# Patient Record
Sex: FEMALE | Race: WHITE | NOT HISPANIC OR LATINO | URBAN - NONMETROPOLITAN AREA
[De-identification: names, ages, dates, MRNs, and addresses within clinical notes are randomized per-mention and may not be internally consistent; named-entity substitution may affect disease eponyms.]

---
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
## 2025-06-08 ENCOUNTER — HOSPITAL ENCOUNTER (OUTPATIENT)
Facility: HOSPITAL | Age: 20
Setting detail: OBSERVATION
Discharge: HOME | End: 2025-06-09
Attending: EMERGENCY MEDICINE | Admitting: INTERNAL MEDICINE
Payer: COMMERCIAL

## 2025-06-08 DIAGNOSIS — E10.10 DIABETIC KETOACIDOSIS WITHOUT COMA ASSOCIATED WITH TYPE 1 DIABETES MELLITUS: Primary | ICD-10-CM

## 2025-06-08 LAB
ALBUMIN SERPL-MCNC: 4.1 G/DL (ref 3.5–5.2)
ALBUMIN/GLOB SERPL: 1.5 G/DL
ALP SERPL-CCNC: 135 U/L (ref 39–117)
ALT SERPL W P-5'-P-CCNC: 10 U/L (ref 1–33)
ANION GAP SERPL CALCULATED.3IONS-SCNC: 22.5 MMOL/L (ref 5–15)
AST SERPL-CCNC: 10 U/L (ref 1–32)
ATMOSPHERIC PRESS: 730 MMHG
BASE EXCESS BLDV CALC-SCNC: -6.8 MMOL/L (ref 0–2)
BASOPHILS # BLD AUTO: 0.03 10*3/MM3 (ref 0–0.2)
BASOPHILS NFR BLD AUTO: 0.5 % (ref 0–1.5)
BDY SITE: ABNORMAL
BILIRUB SERPL-MCNC: 0.6 MG/DL (ref 0–1.2)
BUN SERPL-MCNC: 11 MG/DL (ref 6–20)
BUN/CREAT SERPL: 15.3 (ref 7–25)
CALCIUM SPEC-SCNC: 10 MG/DL (ref 8.6–10.5)
CHLORIDE SERPL-SCNC: 101 MMOL/L (ref 98–107)
CO2 SERPL-SCNC: 14.5 MMOL/L (ref 22–29)
COHGB MFR BLD: 1.1 % (ref 0–5)
CREAT SERPL-MCNC: 0.72 MG/DL (ref 0.57–1)
DEPRECATED RDW RBC AUTO: 44.5 FL (ref 37–54)
EGFRCR SERPLBLD CKD-EPI 2021: 123.7 ML/MIN/1.73
EOSINOPHIL # BLD AUTO: 0.03 10*3/MM3 (ref 0–0.4)
EOSINOPHIL NFR BLD AUTO: 0.5 % (ref 0.3–6.2)
ERYTHROCYTE [DISTWIDTH] IN BLOOD BY AUTOMATED COUNT: 14.4 % (ref 12.3–15.4)
GLOBULIN UR ELPH-MCNC: 2.7 GM/DL
GLUCOSE SERPL-MCNC: 530 MG/DL (ref 65–99)
HCG SERPL QL: NEGATIVE
HCO3 BLDV-SCNC: 18.3 MMOL/L (ref 22–28)
HCT VFR BLD AUTO: 40.1 % (ref 34–46.6)
HGB BLD-MCNC: 14.1 G/DL (ref 12–15.9)
IMM GRANULOCYTES # BLD AUTO: 0.06 10*3/MM3 (ref 0–0.05)
IMM GRANULOCYTES NFR BLD AUTO: 0.9 % (ref 0–0.5)
LYMPHOCYTES # BLD AUTO: 1.64 10*3/MM3 (ref 0.7–3.1)
LYMPHOCYTES NFR BLD AUTO: 25.9 % (ref 19.6–45.3)
Lab: ABNORMAL
MCH RBC QN AUTO: 30.3 PG (ref 26.6–33)
MCHC RBC AUTO-ENTMCNC: 35.2 G/DL (ref 31.5–35.7)
MCV RBC AUTO: 86.2 FL (ref 79–97)
METHGB BLD QL: 0.8 % (ref 0–3)
MODALITY: ABNORMAL
MONOCYTES # BLD AUTO: 0.46 10*3/MM3 (ref 0.1–0.9)
MONOCYTES NFR BLD AUTO: 7.3 % (ref 5–12)
NEUTROPHILS NFR BLD AUTO: 4.12 10*3/MM3 (ref 1.7–7)
NEUTROPHILS NFR BLD AUTO: 64.9 % (ref 42.7–76)
NOTIFIED BY: ABNORMAL
NOTIFIED WHO: ABNORMAL
NRBC BLD AUTO-RTO: 0 /100 WBC (ref 0–0.2)
OXYHGB MFR BLDV: 39.8 % (ref 40–70)
PCO2 BLDV: 34.9 MM HG (ref 40–50)
PH BLDV: 7.33 PH UNITS (ref 7.32–7.42)
PLATELET # BLD AUTO: 301 10*3/MM3 (ref 140–450)
PMV BLD AUTO: 10.5 FL (ref 6–12)
PO2 BLDV: 22.7 MM HG (ref 30–50)
POTASSIUM SERPL-SCNC: 3.6 MMOL/L (ref 3.5–5.2)
PROT SERPL-MCNC: 6.8 G/DL (ref 6–8.5)
RBC # BLD AUTO: 4.65 10*6/MM3 (ref 3.77–5.28)
SAO2 % BLDCOV: 40.6 % (ref 45–75)
SODIUM SERPL-SCNC: 138 MMOL/L (ref 136–145)
VENTILATOR MODE: ABNORMAL
WBC NRBC COR # BLD AUTO: 6.34 10*3/MM3 (ref 3.4–10.8)

## 2025-06-08 PROCEDURE — 84100 ASSAY OF PHOSPHORUS: CPT | Performed by: EMERGENCY MEDICINE

## 2025-06-08 PROCEDURE — 82010 KETONE BODYS QUAN: CPT | Performed by: FAMILY MEDICINE

## 2025-06-08 PROCEDURE — 99285 EMERGENCY DEPT VISIT HI MDM: CPT | Performed by: EMERGENCY MEDICINE

## 2025-06-08 PROCEDURE — 25810000003 SODIUM CHLORIDE 0.9 % SOLUTION: Performed by: EMERGENCY MEDICINE

## 2025-06-08 PROCEDURE — 84703 CHORIONIC GONADOTROPIN ASSAY: CPT | Performed by: EMERGENCY MEDICINE

## 2025-06-08 PROCEDURE — 85025 COMPLETE CBC W/AUTO DIFF WBC: CPT | Performed by: EMERGENCY MEDICINE

## 2025-06-08 PROCEDURE — 82820 HEMOGLOBIN-OXYGEN AFFINITY: CPT

## 2025-06-08 PROCEDURE — 83036 HEMOGLOBIN GLYCOSYLATED A1C: CPT | Performed by: EMERGENCY MEDICINE

## 2025-06-08 PROCEDURE — G0378 HOSPITAL OBSERVATION PER HR: HCPCS

## 2025-06-08 PROCEDURE — 80053 COMPREHEN METABOLIC PANEL: CPT | Performed by: EMERGENCY MEDICINE

## 2025-06-08 PROCEDURE — 82805 BLOOD GASES W/O2 SATURATION: CPT

## 2025-06-08 PROCEDURE — 83735 ASSAY OF MAGNESIUM: CPT | Performed by: EMERGENCY MEDICINE

## 2025-06-08 RX ORDER — SODIUM CHLORIDE 0.9 % (FLUSH) 0.9 %
10 SYRINGE (ML) INJECTION AS NEEDED
Status: DISCONTINUED | OUTPATIENT
Start: 2025-06-08 | End: 2025-06-09 | Stop reason: HOSPADM

## 2025-06-08 RX ADMIN — SODIUM CHLORIDE 1000 ML: 9 INJECTION, SOLUTION INTRAVENOUS at 23:13

## 2025-06-09 ENCOUNTER — APPOINTMENT (OUTPATIENT)
Dept: GENERAL RADIOLOGY | Facility: HOSPITAL | Age: 20
End: 2025-06-09
Payer: COMMERCIAL

## 2025-06-09 VITALS
HEART RATE: 84 BPM | HEIGHT: 64 IN | BODY MASS INDEX: 26.76 KG/M2 | OXYGEN SATURATION: 100 % | WEIGHT: 156.75 LBS | DIASTOLIC BLOOD PRESSURE: 85 MMHG | TEMPERATURE: 98.3 F | SYSTOLIC BLOOD PRESSURE: 118 MMHG | RESPIRATION RATE: 15 BRPM

## 2025-06-09 PROBLEM — E11.10 DKA (DIABETIC KETOACIDOSIS): Status: ACTIVE | Noted: 2025-06-09

## 2025-06-09 LAB
AMPHET+METHAMPHET UR QL: NEGATIVE
AMPHETAMINES UR QL: NEGATIVE
ANION GAP SERPL CALCULATED.3IONS-SCNC: 8.7 MMOL/L (ref 5–15)
ANION GAP SERPL CALCULATED.3IONS-SCNC: 9.6 MMOL/L (ref 5–15)
ANION GAP SERPL CALCULATED.3IONS-SCNC: 9.7 MMOL/L (ref 5–15)
B-OH-BUTYR SERPL-SCNC: 4.94 MMOL/L (ref 0.02–0.27)
BARBITURATES UR QL SCN: NEGATIVE
BENZODIAZ UR QL SCN: NEGATIVE
BILIRUB UR QL STRIP: NEGATIVE
BUN SERPL-MCNC: 6 MG/DL (ref 6–20)
BUN SERPL-MCNC: 7 MG/DL (ref 6–20)
BUN SERPL-MCNC: 9 MG/DL (ref 6–20)
BUN/CREAT SERPL: 17.1 (ref 7–25)
BUN/CREAT SERPL: 21.9 (ref 7–25)
BUN/CREAT SERPL: 24.3 (ref 7–25)
BUPRENORPHINE SERPL-MCNC: NEGATIVE NG/ML
CALCIUM SPEC-SCNC: 8.4 MG/DL (ref 8.6–10.5)
CALCIUM SPEC-SCNC: 8.4 MG/DL (ref 8.6–10.5)
CALCIUM SPEC-SCNC: 8.7 MG/DL (ref 8.6–10.5)
CANNABINOIDS SERPL QL: NEGATIVE
CHLORIDE SERPL-SCNC: 109 MMOL/L (ref 98–107)
CHLORIDE SERPL-SCNC: 111 MMOL/L (ref 98–107)
CHLORIDE SERPL-SCNC: 112 MMOL/L (ref 98–107)
CLARITY UR: CLEAR
CO2 SERPL-SCNC: 17.3 MMOL/L (ref 22–29)
CO2 SERPL-SCNC: 18.3 MMOL/L (ref 22–29)
CO2 SERPL-SCNC: 19.4 MMOL/L (ref 22–29)
COCAINE UR QL: NEGATIVE
COLOR UR: YELLOW
CORTIS SERPL-MCNC: 22.9 MCG/DL
CREAT SERPL-MCNC: 0.32 MG/DL (ref 0.57–1)
CREAT SERPL-MCNC: 0.35 MG/DL (ref 0.57–1)
CREAT SERPL-MCNC: 0.37 MG/DL (ref 0.57–1)
EGFRCR SERPLBLD CKD-EPI 2021: 149.2 ML/MIN/1.73
EGFRCR SERPLBLD CKD-EPI 2021: 151.2 ML/MIN/1.73
EGFRCR SERPLBLD CKD-EPI 2021: 154.5 ML/MIN/1.73
FENTANYL UR-MCNC: NEGATIVE NG/ML
GLUCOSE BLDC GLUCOMTR-MCNC: 114 MG/DL (ref 70–130)
GLUCOSE BLDC GLUCOMTR-MCNC: 122 MG/DL (ref 70–130)
GLUCOSE BLDC GLUCOMTR-MCNC: 130 MG/DL (ref 70–130)
GLUCOSE BLDC GLUCOMTR-MCNC: 131 MG/DL (ref 70–130)
GLUCOSE BLDC GLUCOMTR-MCNC: 133 MG/DL (ref 70–130)
GLUCOSE BLDC GLUCOMTR-MCNC: 146 MG/DL (ref 70–130)
GLUCOSE BLDC GLUCOMTR-MCNC: 150 MG/DL (ref 70–130)
GLUCOSE BLDC GLUCOMTR-MCNC: 177 MG/DL (ref 70–130)
GLUCOSE BLDC GLUCOMTR-MCNC: 200 MG/DL (ref 70–130)
GLUCOSE BLDC GLUCOMTR-MCNC: 213 MG/DL (ref 70–130)
GLUCOSE BLDC GLUCOMTR-MCNC: 219 MG/DL (ref 70–130)
GLUCOSE BLDC GLUCOMTR-MCNC: 238 MG/DL (ref 70–130)
GLUCOSE BLDC GLUCOMTR-MCNC: 291 MG/DL (ref 70–130)
GLUCOSE BLDC GLUCOMTR-MCNC: 375 MG/DL (ref 70–130)
GLUCOSE BLDC GLUCOMTR-MCNC: 470 MG/DL (ref 70–130)
GLUCOSE SERPL-MCNC: 111 MG/DL (ref 65–99)
GLUCOSE SERPL-MCNC: 181 MG/DL (ref 65–99)
GLUCOSE SERPL-MCNC: 243 MG/DL (ref 65–99)
GLUCOSE UR STRIP-MCNC: ABNORMAL MG/DL
HBA1C MFR BLD: 12.1 % (ref 4.8–5.6)
HGB UR QL STRIP.AUTO: NEGATIVE
KETONES UR QL STRIP: ABNORMAL
LEUKOCYTE ESTERASE UR QL STRIP.AUTO: NEGATIVE
MAGNESIUM SERPL-MCNC: 1.7 MG/DL (ref 1.7–2.2)
MAGNESIUM SERPL-MCNC: 1.7 MG/DL (ref 1.7–2.2)
MAGNESIUM SERPL-MCNC: 1.8 MG/DL (ref 1.7–2.2)
MAGNESIUM SERPL-MCNC: 1.8 MG/DL (ref 1.7–2.2)
METHADONE UR QL SCN: NEGATIVE
NITRITE UR QL STRIP: NEGATIVE
OPIATES UR QL: NEGATIVE
OSMOLALITY SERPL: 289 MOSM/KG (ref 275–300)
OXYCODONE UR QL SCN: NEGATIVE
PCP UR QL SCN: NEGATIVE
PH UR STRIP.AUTO: 5.5 [PH] (ref 5–8)
PHOSPHATE SERPL-MCNC: 2.9 MG/DL (ref 2.5–4.5)
PHOSPHATE SERPL-MCNC: 3.4 MG/DL (ref 2.5–4.5)
POTASSIUM SERPL-SCNC: 3.3 MMOL/L (ref 3.5–5.2)
POTASSIUM SERPL-SCNC: 3.4 MMOL/L (ref 3.5–5.2)
POTASSIUM SERPL-SCNC: 3.4 MMOL/L (ref 3.5–5.2)
PROT UR QL STRIP: NEGATIVE
SODIUM SERPL-SCNC: 138 MMOL/L (ref 136–145)
SODIUM SERPL-SCNC: 138 MMOL/L (ref 136–145)
SODIUM SERPL-SCNC: 139 MMOL/L (ref 136–145)
SP GR UR STRIP: >=1.03 (ref 1–1.03)
TRICYCLICS UR QL SCN: NEGATIVE
TSH SERPL DL<=0.05 MIU/L-ACNC: 1.47 UIU/ML (ref 0.27–4.2)
UROBILINOGEN UR QL STRIP: ABNORMAL

## 2025-06-09 PROCEDURE — 82948 REAGENT STRIP/BLOOD GLUCOSE: CPT

## 2025-06-09 PROCEDURE — 82533 TOTAL CORTISOL: CPT | Performed by: INTERNAL MEDICINE

## 2025-06-09 PROCEDURE — 80048 BASIC METABOLIC PNL TOTAL CA: CPT | Performed by: FAMILY MEDICINE

## 2025-06-09 PROCEDURE — 84443 ASSAY THYROID STIM HORMONE: CPT | Performed by: INTERNAL MEDICINE

## 2025-06-09 PROCEDURE — 25010000002 POTASSIUM CHLORIDE 10 MEQ/100ML SOLUTION: Performed by: INTERNAL MEDICINE

## 2025-06-09 PROCEDURE — 80307 DRUG TEST PRSMV CHEM ANLYZR: CPT | Performed by: FAMILY MEDICINE

## 2025-06-09 PROCEDURE — 83930 ASSAY OF BLOOD OSMOLALITY: CPT | Performed by: FAMILY MEDICINE

## 2025-06-09 PROCEDURE — 81003 URINALYSIS AUTO W/O SCOPE: CPT | Performed by: FAMILY MEDICINE

## 2025-06-09 PROCEDURE — G0378 HOSPITAL OBSERVATION PER HR: HCPCS

## 2025-06-09 PROCEDURE — 96368 THER/DIAG CONCURRENT INF: CPT

## 2025-06-09 PROCEDURE — 71045 X-RAY EXAM CHEST 1 VIEW: CPT

## 2025-06-09 PROCEDURE — 96366 THER/PROPH/DIAG IV INF ADDON: CPT

## 2025-06-09 PROCEDURE — 63710000001 INSULIN GLARGINE PER 5 UNITS: Performed by: INTERNAL MEDICINE

## 2025-06-09 PROCEDURE — 25010000002 POTASSIUM CHLORIDE 10 MEQ/100ML SOLUTION: Performed by: FAMILY MEDICINE

## 2025-06-09 PROCEDURE — 83735 ASSAY OF MAGNESIUM: CPT | Performed by: FAMILY MEDICINE

## 2025-06-09 PROCEDURE — 84100 ASSAY OF PHOSPHORUS: CPT | Performed by: FAMILY MEDICINE

## 2025-06-09 PROCEDURE — 96365 THER/PROPH/DIAG IV INF INIT: CPT

## 2025-06-09 PROCEDURE — 25010000002 POTASSIUM CHLORIDE PER 2 MEQ: Performed by: FAMILY MEDICINE

## 2025-06-09 RX ORDER — DEXTROSE MONOHYDRATE, SODIUM CHLORIDE, AND POTASSIUM CHLORIDE 50; 2.98; 4.5 G/1000ML; G/1000ML; G/1000ML
125 INJECTION, SOLUTION INTRAVENOUS CONTINUOUS PRN
Status: DISCONTINUED | OUTPATIENT
Start: 2025-06-09 | End: 2025-06-09

## 2025-06-09 RX ORDER — INSULIN LISPRO 100 [IU]/ML
1-200 INJECTION, SOLUTION INTRAVENOUS; SUBCUTANEOUS
Status: DISCONTINUED | OUTPATIENT
Start: 2025-06-09 | End: 2025-06-09

## 2025-06-09 RX ORDER — SODIUM CHLORIDE 0.9 % (FLUSH) 0.9 %
10 SYRINGE (ML) INJECTION EVERY 12 HOURS SCHEDULED
Status: DISCONTINUED | OUTPATIENT
Start: 2025-06-09 | End: 2025-06-09

## 2025-06-09 RX ORDER — DEXTROSE MONOHYDRATE, SODIUM CHLORIDE, AND POTASSIUM CHLORIDE 50; 1.49; 9 G/1000ML; G/1000ML; G/1000ML
125 INJECTION, SOLUTION INTRAVENOUS CONTINUOUS PRN
Status: DISCONTINUED | OUTPATIENT
Start: 2025-06-09 | End: 2025-06-09

## 2025-06-09 RX ORDER — DEXTROSE MONOHYDRATE AND SODIUM CHLORIDE 5; .9 G/100ML; G/100ML
125 INJECTION, SOLUTION INTRAVENOUS CONTINUOUS PRN
Status: DISCONTINUED | OUTPATIENT
Start: 2025-06-09 | End: 2025-06-09

## 2025-06-09 RX ORDER — SODIUM CHLORIDE 9 MG/ML
40 INJECTION, SOLUTION INTRAVENOUS AS NEEDED
Status: DISCONTINUED | OUTPATIENT
Start: 2025-06-09 | End: 2025-06-09 | Stop reason: HOSPADM

## 2025-06-09 RX ORDER — BISACODYL 5 MG/1
5 TABLET, DELAYED RELEASE ORAL DAILY PRN
Status: DISCONTINUED | OUTPATIENT
Start: 2025-06-09 | End: 2025-06-09

## 2025-06-09 RX ORDER — ONDANSETRON 4 MG/1
4 TABLET, ORALLY DISINTEGRATING ORAL EVERY 6 HOURS PRN
Status: DISCONTINUED | OUTPATIENT
Start: 2025-06-09 | End: 2025-06-09 | Stop reason: HOSPADM

## 2025-06-09 RX ORDER — NICOTINE POLACRILEX 4 MG
15 LOZENGE BUCCAL
Status: DISCONTINUED | OUTPATIENT
Start: 2025-06-09 | End: 2025-06-09 | Stop reason: HOSPADM

## 2025-06-09 RX ORDER — INSULIN LISPRO 100 [IU]/ML
1-200 INJECTION, SOLUTION INTRAVENOUS; SUBCUTANEOUS AS NEEDED
Status: DISCONTINUED | OUTPATIENT
Start: 2025-06-09 | End: 2025-06-09 | Stop reason: HOSPADM

## 2025-06-09 RX ORDER — POTASSIUM CHLORIDE 7.45 MG/ML
10 INJECTION INTRAVENOUS
Status: DISCONTINUED | OUTPATIENT
Start: 2025-06-09 | End: 2025-06-09

## 2025-06-09 RX ORDER — SODIUM CHLORIDE 450 MG/100ML
200 INJECTION, SOLUTION INTRAVENOUS CONTINUOUS PRN
Status: DISCONTINUED | OUTPATIENT
Start: 2025-06-09 | End: 2025-06-09

## 2025-06-09 RX ORDER — DEXTROSE MONOHYDRATE AND SODIUM CHLORIDE 5; .45 G/100ML; G/100ML
125 INJECTION, SOLUTION INTRAVENOUS CONTINUOUS PRN
Status: DISCONTINUED | OUTPATIENT
Start: 2025-06-09 | End: 2025-06-09

## 2025-06-09 RX ORDER — AMOXICILLIN 250 MG
2 CAPSULE ORAL 2 TIMES DAILY
Status: DISCONTINUED | OUTPATIENT
Start: 2025-06-09 | End: 2025-06-09

## 2025-06-09 RX ORDER — POLYETHYLENE GLYCOL 3350 17 G/17G
17 POWDER, FOR SOLUTION ORAL DAILY PRN
Status: DISCONTINUED | OUTPATIENT
Start: 2025-06-09 | End: 2025-06-09

## 2025-06-09 RX ORDER — NITROGLYCERIN 0.4 MG/1
0.4 TABLET SUBLINGUAL
Status: DISCONTINUED | OUTPATIENT
Start: 2025-06-09 | End: 2025-06-09 | Stop reason: HOSPADM

## 2025-06-09 RX ORDER — NICOTINE POLACRILEX 4 MG
15 LOZENGE BUCCAL
Status: DISCONTINUED | OUTPATIENT
Start: 2025-06-09 | End: 2025-06-09 | Stop reason: SDUPTHER

## 2025-06-09 RX ORDER — SODIUM CHLORIDE 0.9 % (FLUSH) 0.9 %
10 SYRINGE (ML) INJECTION AS NEEDED
Status: DISCONTINUED | OUTPATIENT
Start: 2025-06-09 | End: 2025-06-09 | Stop reason: HOSPADM

## 2025-06-09 RX ORDER — DEXTROSE MONOHYDRATE 25 G/50ML
10-50 INJECTION, SOLUTION INTRAVENOUS
Status: DISCONTINUED | OUTPATIENT
Start: 2025-06-09 | End: 2025-06-09

## 2025-06-09 RX ORDER — BISACODYL 10 MG
10 SUPPOSITORY, RECTAL RECTAL DAILY PRN
Status: DISCONTINUED | OUTPATIENT
Start: 2025-06-09 | End: 2025-06-09

## 2025-06-09 RX ORDER — DEXTROSE MONOHYDRATE, SODIUM CHLORIDE, AND POTASSIUM CHLORIDE 50; 2.98; 9 G/1000ML; G/1000ML; G/1000ML
125 INJECTION, SOLUTION INTRAVENOUS CONTINUOUS PRN
Status: DISCONTINUED | OUTPATIENT
Start: 2025-06-09 | End: 2025-06-09

## 2025-06-09 RX ORDER — DEXTROSE MONOHYDRATE 25 G/50ML
10-50 INJECTION, SOLUTION INTRAVENOUS
Status: DISCONTINUED | OUTPATIENT
Start: 2025-06-09 | End: 2025-06-09 | Stop reason: HOSPADM

## 2025-06-09 RX ORDER — SODIUM CHLORIDE AND POTASSIUM CHLORIDE 150; 450 MG/100ML; MG/100ML
200 INJECTION, SOLUTION INTRAVENOUS CONTINUOUS PRN
Status: DISCONTINUED | OUTPATIENT
Start: 2025-06-09 | End: 2025-06-09

## 2025-06-09 RX ORDER — SODIUM CHLORIDE 9 MG/ML
200 INJECTION, SOLUTION INTRAVENOUS CONTINUOUS PRN
Status: DISCONTINUED | OUTPATIENT
Start: 2025-06-09 | End: 2025-06-09

## 2025-06-09 RX ORDER — ACETAMINOPHEN 325 MG/1
650 TABLET ORAL EVERY 4 HOURS PRN
Status: DISCONTINUED | OUTPATIENT
Start: 2025-06-09 | End: 2025-06-09 | Stop reason: HOSPADM

## 2025-06-09 RX ORDER — POTASSIUM CHLORIDE 7.45 MG/ML
10 INJECTION INTRAVENOUS
Status: COMPLETED | OUTPATIENT
Start: 2025-06-09 | End: 2025-06-09

## 2025-06-09 RX ORDER — INSULIN LISPRO 100 [IU]/ML
1-200 INJECTION, SOLUTION INTRAVENOUS; SUBCUTANEOUS
Status: DISCONTINUED | OUTPATIENT
Start: 2025-06-09 | End: 2025-06-09 | Stop reason: HOSPADM

## 2025-06-09 RX ORDER — IBUPROFEN 600 MG/1
1 TABLET ORAL
Status: DISCONTINUED | OUTPATIENT
Start: 2025-06-09 | End: 2025-06-09 | Stop reason: HOSPADM

## 2025-06-09 RX ORDER — INSULIN GLARGINE 100 [IU]/ML
50 INJECTION, SOLUTION SUBCUTANEOUS NIGHTLY
COMMUNITY

## 2025-06-09 RX ORDER — SODIUM CHLORIDE AND POTASSIUM CHLORIDE 300; 900 MG/100ML; MG/100ML
200 INJECTION, SOLUTION INTRAVENOUS CONTINUOUS PRN
Status: DISCONTINUED | OUTPATIENT
Start: 2025-06-09 | End: 2025-06-09

## 2025-06-09 RX ORDER — ONDANSETRON 2 MG/ML
4 INJECTION INTRAMUSCULAR; INTRAVENOUS EVERY 6 HOURS PRN
Status: DISCONTINUED | OUTPATIENT
Start: 2025-06-09 | End: 2025-06-09 | Stop reason: HOSPADM

## 2025-06-09 RX ORDER — SODIUM CHLORIDE AND POTASSIUM CHLORIDE 150; 900 MG/100ML; MG/100ML
200 INJECTION, SOLUTION INTRAVENOUS CONTINUOUS PRN
Status: DISCONTINUED | OUTPATIENT
Start: 2025-06-09 | End: 2025-06-09

## 2025-06-09 RX ORDER — DEXTROSE MONOHYDRATE, SODIUM CHLORIDE, AND POTASSIUM CHLORIDE 50; 1.49; 4.5 G/1000ML; G/1000ML; G/1000ML
125 INJECTION, SOLUTION INTRAVENOUS CONTINUOUS PRN
Status: DISCONTINUED | OUTPATIENT
Start: 2025-06-09 | End: 2025-06-09

## 2025-06-09 RX ORDER — ONDANSETRON 4 MG/1
4 TABLET, ORALLY DISINTEGRATING ORAL EVERY 8 HOURS PRN
Qty: 20 TABLET | Refills: 0 | Status: SHIPPED | OUTPATIENT
Start: 2025-06-09

## 2025-06-09 RX ORDER — ACETAMINOPHEN 650 MG/1
325 SUPPOSITORY RECTAL EVERY 4 HOURS PRN
Status: DISCONTINUED | OUTPATIENT
Start: 2025-06-09 | End: 2025-06-09 | Stop reason: HOSPADM

## 2025-06-09 RX ADMIN — POTASSIUM CHLORIDE 10 MEQ: 7.46 INJECTION, SOLUTION INTRAVENOUS at 03:04

## 2025-06-09 RX ADMIN — Medication 10 ML: at 00:25

## 2025-06-09 RX ADMIN — INSULIN HUMAN 4.1 UNITS/HR: 1 INJECTION, SOLUTION INTRAVENOUS at 00:55

## 2025-06-09 RX ADMIN — POTASSIUM CHLORIDE 10 MEQ: 7.46 INJECTION, SOLUTION INTRAVENOUS at 01:49

## 2025-06-09 RX ADMIN — POTASSIUM CHLORIDE 10 MEQ: 7.46 INJECTION, SOLUTION INTRAVENOUS at 13:33

## 2025-06-09 RX ADMIN — MUPIROCIN 1 APPLICATION: 20 OINTMENT TOPICAL at 02:56

## 2025-06-09 RX ADMIN — INSULIN GLARGINE 6 UNITS: 100 INJECTION, SOLUTION SUBCUTANEOUS at 13:59

## 2025-06-09 RX ADMIN — POTASSIUM CHLORIDE AND SODIUM CHLORIDE 200 ML/HR: 450; 150 INJECTION, SOLUTION INTRAVENOUS at 01:46

## 2025-06-09 RX ADMIN — POTASSIUM CHLORIDE 10 MEQ: 7.46 INJECTION, SOLUTION INTRAVENOUS at 04:10

## 2025-06-09 RX ADMIN — Medication 10 ML: at 00:51

## 2025-06-09 RX ADMIN — POTASSIUM CHLORIDE, DEXTROSE MONOHYDRATE AND SODIUM CHLORIDE 125 ML/HR: 150; 5; 450 INJECTION, SOLUTION INTRAVENOUS at 04:09

## 2025-06-09 RX ADMIN — Medication 10 ML: at 09:32

## 2025-06-09 RX ADMIN — POTASSIUM CHLORIDE 10 MEQ: 7.46 INJECTION, SOLUTION INTRAVENOUS at 05:14

## 2025-06-09 RX ADMIN — POTASSIUM CHLORIDE 10 MEQ: 7.46 INJECTION, SOLUTION INTRAVENOUS at 14:57

## 2025-06-09 RX ADMIN — SODIUM CHLORIDE 1000 ML/HR: 9 INJECTION, SOLUTION INTRAVENOUS at 00:26

## 2025-06-09 RX ADMIN — INSULIN GLARGINE 14 UNITS: 100 INJECTION, SOLUTION SUBCUTANEOUS at 16:14

## 2025-06-09 RX ADMIN — ACETAMINOPHEN 650 MG: 325 TABLET ORAL at 02:56

## 2025-06-09 RX ADMIN — POTASSIUM CHLORIDE 10 MEQ: 7.46 INJECTION, SOLUTION INTRAVENOUS at 12:02

## 2025-06-09 RX ADMIN — POTASSIUM CHLORIDE 10 MEQ: 7.46 INJECTION, SOLUTION INTRAVENOUS at 10:56

## 2025-06-09 NOTE — PLAN OF CARE
Goal Outcome Evaluation:              Outcome Evaluation: Pt without c/.o N/V.  Sinus rhythm on monitor/Remains on room air.  New admit this shift--glu checks improving.  Pt tolerating sips of water with med's.

## 2025-06-09 NOTE — PROGRESS NOTES
"Patient Name: Chanell Dean  YOB: 2005  MRN: [id number]  Admission date: 6/8/2025  Reason for Encounter: MST 2-3 or Nursing Admission Screen    [facility] Clinical Nutrition Assessment     Subjective    Subjective Information     6/9: Patient w/ MST score of 3 - patient w/ 11lbs (6%) wt loss w/in 7 months. Pt admitted w/ DKA and A1C of 12%. Patient currently NPO and currently on glucommander protocol. Will follow-up and monitor diet advancement.      Assessment    H&P and Current Problems      H&P  Past Medical History:   Diagnosis Date    Seizure     \"My blood sugar was high\"    Type 1 diabetes mellitus 2018      History reviewed. No pertinent surgical history.   Current Problems   Admission Diagnosis:  DKA (diabetic ketoacidosis) [E11.10]    Problem List:    DKA (diabetic ketoacidosis)      Other Applicable Nutrition Information:   Insulin dependent T1DM - A1c of 12%     Anthropometrics      BMI, Height, Weight Estimated body mass index is 27.33 kg/m² as calculated from the following:    Height as of this encounter: 161.3 cm (63.5\").    Weight as of this encounter: 71.1 kg (156 lb 12 oz).    Weight Method: Bed scale       Trending Weight Changes weight loss of 11 lbs (6%) over 7 month(s)    Significant?  No       Weight History  Wt Readings from Last 20 Encounters:   06/09/25 0146 71.1 kg (156 lb 12 oz) (85%, Z= 1.04)*   06/08/25 2233 72.1 kg (159 lb) (86%, Z= 1.10)*   10/23/24 1058 73.9 kg (163 lb) (89%, Z= 1.25)*   10/08/24 1047 75.8 kg (167 lb) (91%, Z= 1.34)*     * Growth percentiles are based on CDC (Girls, 2-20 Years) data.        Labs      Comment: Elevated glucose & A1c     Results from last 7 days   Lab Units 06/09/25  0408 06/08/25  2312   SODIUM mmol/L 138 138   POTASSIUM mmol/L 3.4* 3.6   GLUCOSE mg/dL 243* 530*   BUN mg/dL 9.0 11.0   CREATININE mg/dL 0.37* 0.72   CALCIUM mg/dL 8.4* 10.0   PHOSPHORUS mg/dL 2.9 3.4   MAGNESIUM mg/dL 1.7 1.8   ALBUMIN g/dL  --  4.1   BILIRUBIN mg/dL  --  " 0.6   ALK PHOS U/L  --  135*   AST (SGOT) U/L  --  10   ALT (SGPT) U/L  --  10     Results from last 7 days   Lab Units 06/08/25  2312   PLATELETS 10*3/mm3 301   HEMOGLOBIN g/dL 14.1   HEMATOCRIT % 40.1     Lab Results   Component Value Date    HGBA1C 12.10 (H) 06/08/2025          Medications       Scheduled Medications mupirocin, 1 Application, Each Nare, BID  senna-docusate sodium, 2 tablet, Oral, BID  sodium chloride, 10 mL, Intravenous, Q12H  sodium chloride, 10 mL, Intravenous, Q12H        Infusions dextrose 5 % and sodium chloride 0.45 %, 125 mL/hr  dextrose 5 % and sodium chloride 0.45 % with KCl 20 mEq/L, 125 mL/hr, Last Rate: 125 mL/hr (06/09/25 0817)  dextrose 5 % and sodium chloride 0.45 % with KCl 40 mEq/L, 125 mL/hr  dextrose 5 % and sodium chloride 0.9 %, 125 mL/hr  dextrose 5 % and sodium chloride 0.9 % with KCl 20 mEq, 125 mL/hr  dextrose 5% and sodium chloride 0.9% with KCl 40 mEq/L, 125 mL/hr  insulin, 0-100 Units/hr, Last Rate: 2.9 Units/hr (06/09/25 0813)  sodium chloride 0.45 % 1,000 mL with potassium chloride 40 mEq infusion, 200 mL/hr  sodium chloride, 200 mL/hr  sodium chloride 0.45 % with KCl 20 mEq, 200 mL/hr, Last Rate: Stopped (06/09/25 0409)  sodium chloride, 200 mL/hr  sodium chloride 0.9 % with KCl 20 mEq, 200 mL/hr  sodium chloride 0.9 % with KCl 40 mEq/L, 200 mL/hr        PRN Medications   acetaminophen **OR** acetaminophen    senna-docusate sodium **AND** polyethylene glycol **AND** bisacodyl **AND** bisacodyl    Calcium Replacement - Follow Nurse / BPA Driven Protocol    dextrose    dextrose    dextrose 5 % and sodium chloride 0.45 %    dextrose 5 % and sodium chloride 0.45 % with KCl 20 mEq/L    dextrose 5 % and sodium chloride 0.45 % with KCl 40 mEq/L    dextrose 5 % and sodium chloride 0.9 %    dextrose 5 % and sodium chloride 0.9 % with KCl 20 mEq    dextrose 5% and sodium chloride 0.9% with KCl 40 mEq/L    glucagon (human recombinant)    Magnesium Standard Dose Replacement -  Follow Nurse / BPA Driven Protocol    nitroglycerin    ondansetron ODT **OR** ondansetron    Phosphorus Replacement - Follow Nurse / BPA Driven Protocol    Potassium Replacement - Follow Nurse / BPA Driven Protocol    sodium chloride 0.45 % 1,000 mL with potassium chloride 40 mEq infusion    sodium chloride    sodium chloride 0.45 % with KCl 20 mEq    [COMPLETED] Insert Peripheral IV **AND** sodium chloride    sodium chloride    sodium chloride    sodium chloride    sodium chloride    sodium chloride    sodium chloride 0.9 % with KCl 20 mEq    sodium chloride 0.9 % with KCl 40 mEq/L     Physical Findings      Chewing/Swallowing  Teeth Status: Mouth/Teeth WDL: .WDL except, teeth Teeth Symptoms: tooth/teeth missing  Chewing/Swallowing Issues: No issues identified at this time   Edema            Leg, Left Edema: 1+ (Trace) Leg, Right Edema: 1+ (Trace)             Bowel Function  Stool Output  Perineal Care: absorbent brief/pad changed (06/09/25 0145)  Last Bowel Movement: 06/08/25   I/Os  Intake & Output (last 3 days)         06/06 0701  06/07 0700 06/07 0701  06/08 0700 06/08 0701  06/09 0700 06/09 0701  06/10 0700    P.O.   75     I.V. (mL/kg)   641.5 (9)     IV Piggyback   1365     Total Intake(mL/kg)   2081.5 (29.3)     Net   +2081.5                    Lines, Drains, Airways, & Wounds       Active LDAs       Name Placement date Placement time Site Days Last dressing change    Peripheral IV 06/08/25 2313 18 G Anterior;Distal;Left;Upper Arm 06/08/25  2313  Arm  less than 1     Peripheral IV 06/09/25 0109 20 G Posterior;Right Hand 06/09/25  0109  Hand  less than 1                       Nutrition Focused Physical Exam     Trending NFPE      Malnutrition Severity Assessment              Estimated Needs      Energy Requirements    Weight for Calculation 71kg   Method for Estimation  25-30 kcals/kg   Daily Needs (kcal/day) 8158-1328   Protein Requirements    Weight for Calculation 71kg   Method for Estimation 1.0-1.2  gm/kg   Daily Needs (g/day) 71-85   Fluid Requirements     Method for Estimation 1 mL/kcal    Daily Needs (mL/day) 2130     Current Nutrition Orders & Evaluation of Intake      Oral Nutrition     Food Allergies  and Intolerances NKFA   Current PO Diet NPO Diet NPO Type: Strict NPO, Ice Chips, Sips with Meds   Oral Nutrition Supplement None     Trending % PO Intake 6/9: NPO     Enteral Nutrition     Current EN Order Patient isn't on Tube Feeding  Patient doesn't have any tube feeding modular orders     EN Route      EN Tolerance     EN Observation/Intake         Parenteral Nutrition     Current TPN Order    TPN Route    Lipids (mL/%/frequency)     Total # Days on TPN    TPN Observation/Intake       Assessment & Plan   Nutrition Diagnosis and Goals       Nutrition Diagnosis 1 Altered Nutrition Related Lab Values  r/t uncontrolled T1DM as evidenced by A1C of 12%      Nutrition Diagnosis 2         Goal(s) PO Diet Advances When Medically Appropriate      Nutrition Intervention and Prescription       Intervention  Monitor for diet advancement      Diet Prescription NPO    Supplement Prescription      Enteral Prescription        TPN Prescription        Education Provided       Monitoring/Evaluation       Monitor/Evaluation Per Protocol, I&O, PO Intake, Pertinent Labs, Weight, Skin Status, GI Status, Symptoms, POC/GOC, and Hemodynamic Stability      RD Follow-Up Encounter 1-2 days     Electronically signed by:  Laura Ye RD  06/09/25 08:24 EDT

## 2025-06-09 NOTE — NURSING NOTE
Pt asked about her endocrinologist and PCP for follow up appointments. Pt states that she already has a follow up appointment placed with [facility] in [place] and already has an appointment in 2 weeks with her endocrinologist.

## 2025-06-09 NOTE — DISCHARGE SUMMARY
[facility] HOSPITALIST   DISCHARGE SUMMARY      Name:  Chanell Dean   Age:  19 y.o.  Sex:  female  :  2005  MRN:  [id number]   Visit Number:  [id number]    Admission Date:  2025  Date of Discharge:  2025  Primary Care Physician:  Provider, No Known    Important issues to note:    Start: Zofran  Stop: Nothing  Follow up: PCP and endocrinology  Brief Summary: Presented with DKA due to uncontrolled type 1 diabetes. Initially had required DKA protocol in ICU which improved to normal range blood sugars with tolerating p.o. by the time of discharge.  Patient has all the insulin and testing supplies that she needs.    Discharge Diagnoses:     Mild DKA in setting of type 1 diabetes, poorly controlled, POA  Dehydration    Problem List:     Active Hospital Problems    Diagnosis  POA    **DKA (diabetic ketoacidosis) [E11.10]  Yes      Resolved Hospital Problems   No resolved problems to display.     Presenting Problem:    Chief Complaint   Patient presents with    Hyperglycemia      Consults:     Consulting Physician(s)                     None                    History Of Presenting Illness:       19-year-old female with a history of insulin-dependent diabetes, anxiety, who presented to the emergency room with complaints of weakness, dizziness, dehydration and high glucose.  States has a history of uncontrolled glucose, DKA in the past.  Feels dehydrated.  Sugars been running in the 300 range at home or more.  Recently moved back to [place], does not currently have a doctor here.  Previously saw Kim Lin endocrinology at .  She denies any abdominal pain or vomiting.  No changes in bowel.  No chest pain palpitations or shortness of breath.     Of note, RN noted patient had indicated some issues with her current living conditions, police have been contacted prior to my evaluation.  Not sure of all the details.     In the ER she was hemodynamically stable.  She had evidence of mild  metabolic acidosis, hyperglycemia.  Normal kidney function.  Chest x-ray unremarkable.  She had been ordered IV fluid resuscitation was started on Glucomander for DKA.    Hospital Course:    DKA:  6/9/25:Admitting provider documentation reviewed. AG resolved.  Tolerated p.o.  Normal TSH cortisol pending    DKA resolved.  Follow-up with her endocrinologist.  Patient has insulin CGM ordered.  Zofran ordered.    Dehydration resolved.    Code Status: Full  Diet: Diabetic  Disposition: Home  Edited by: Jay Sanders DO at 6/9/2025 1546      Vital Signs:    Temp:  [97.9 °F (36.6 °C)-98.6 °F (37 °C)] 98.3 °F (36.8 °C)  Heart Rate:  [] 70  Resp:  [8-22] 15  BP: ()/(57-95) 98/75    Physical Exam:    Constitutional: No acute distress, awake, alert  HENT: NCAT, mucous membranes moist  Respiratory: Clear to auscultation bilaterally, respiratory effort normal   Cardiovascular: RRR, no murmurs, rubs, or gallops  Gastrointestinal: Positive bowel sounds, soft, nontender, nondistended  Musculoskeletal: No bilateral ankle edema  Psychiatric: Appropriate affect, cooperative  Neurologic: Oriented x 3, speech clear  Skin: No rashes  Edited by: Jay Sanders DO at 6/9/2025 0707    Pertinent Lab Results:     Results from last 7 days   Lab Units 06/09/25  1218 06/09/25  0807 06/09/25  0408 06/08/25  2312   SODIUM mmol/L 138 139 138 138   POTASSIUM mmol/L 3.4* 3.3* 3.4* 3.6   CHLORIDE mmol/L 109* 112* 111* 101   CO2 mmol/L 19.4* 18.3* 17.3* 14.5*   BUN mg/dL 6.0 7.0 9.0 11.0   CREATININE mg/dL 0.35* 0.32* 0.37* 0.72   CALCIUM mg/dL 8.7 8.4* 8.4* 10.0   BILIRUBIN mg/dL  --   --   --  0.6   ALK PHOS U/L  --   --   --  135*   ALT (SGPT) U/L  --   --   --  10   AST (SGOT) U/L  --   --   --  10   GLUCOSE mg/dL 111* 181* 243* 530*     Results from last 7 days   Lab Units 06/08/25  2312   WBC 10*3/mm3 6.34   HEMOGLOBIN g/dL 14.1   HEMATOCRIT % 40.1   PLATELETS 10*3/mm3 301                                   Pertinent  Radiology Results:    Imaging Results (All)       Procedure Component Value Units Date/Time    XR Chest 1 View [[id number]] Collected: 06/09/25 0804     Updated: 06/09/25 0808    Narrative:      PROCEDURE: XR CHEST 1 VW-     HISTORY: Assess for Fluid Overload, near syncope, high blood sugar     COMPARISON: None.     FINDINGS: The heart is normal in size. Question of faint right  infrahilar opacity; this may represent overlying soft tissue. Left lung  is clear.. The mediastinum is unremarkable. There is no pneumothorax.   There are no acute osseous abnormalities. Apical lordotic positioning  noted.        Impression:      Question faint right infrahilar opacity versus overlying  soft tissue; small focus of pneumonitis not excluded. Follow-up may be  helpful..              This report was signed and finalized on 6/9/2025 8:06 AM by Rupal Jones MD.               Echo:      Condition on Discharge:      Stable.    Code status during the hospital stay:    Code Status and Medical Interventions: CPR (Attempt to Resuscitate); Full Support   Ordered at: 06/09/25 0059     Code Status (Patient has no pulse and is not breathing):    CPR (Attempt to Resuscitate)     Medical Interventions (Patient has pulse or is breathing):    Full Support     Discharge Disposition:        Discharge Medications:       Discharge Medications        New Medications        Instructions Start Date   ondansetron ODT 4 MG disintegrating tablet  Commonly known as: ZOFRAN-ODT   4 mg, Translingual, Every 8 Hours PRN             Continue These Medications        Instructions Start Date   Dexcom G6 Transmitter misc   USE AS DIRECTED AND CHANGE EVERY 3 MONTHS      insulin glargine 100 UNIT/ML injection  Commonly known as: LANTUS, SEMGLEE   50 Units, Subcutaneous, Nightly, Pt has been taking 50 units.      Insulin Lispro (1 Unit Dial) 100 UNIT/ML solution pen-injector  Commonly known as: HUMALOG   INJECT 1 UNIT FOR EVERY 5G CARB AND AS NEEDED FOR  HYPERGLYCEMIA.  UNITS      Potassium 99 MG tablet   99 mg, Every Other Day             Stop These Medications      Tresiba FlexTouch 200 UNIT/ML solution pen-injector pen injection  Generic drug: Insulin Degludec            Discharge Diet:     Diet Instructions       Advance Diet As Tolerated -Target Diet: Diabetic diet      Target Diet: Diabetic diet          Activity at Discharge:       Follow-up Appointments:    Additional Instructions for the Follow-ups that You Need to Schedule       Discharge Follow-up with PCP   As directed       Currently Documented PCP:    Provider, No Known    PCP Phone Number:    None     Follow Up Details: 1 week        Discharge Follow-up with Specified Provider: Endocrinology; 2 Weeks   As directed      To: Endocrinology   Follow Up: 2 Weeks               Follow-up Information       Provider, No Known .    Why: 1 week  Contact information:  [place] [phone]                           No future appointments.  Test Results Pending at Discharge:    Pending Labs       Order Current Status    Cortisol In process    TSH In process               Jay Sanders DO  06/09/25  15:55 EDT    Time: I spent 45 minutes on this discharge activity which included: face-to-face encounter with the patient, reviewing the data in the system, coordination of the care with the nursing staff as well as consultants, documentation, and entering orders.     Dictated utilizing Dragon dictation.

## 2025-06-09 NOTE — ED PROVIDER NOTES
" EMERGENCY DEPARTMENT ENCOUNTER    Pt Name: Chanell Dean  MRN: [id number]  Pt :   2005  Room Number:  I02/1  Date of encounter:  2025  PCP: Provider, No Known  ED Provider: Manish Palomo MD    Historian: Patient      HPI:  Chief Complaint: Syncope, hyperglycemia        Context: Chanell Dean is a 19 y.o. female who presents to the ED c/o syncope, hyperglycemia.  Patient has a past medical history significant for type 1 diabetes.  She reports that she takes Lantus and lispro.  She says she has been compliant with her medications.  She says that her blood sugars usually run around 200.  She says this evening she was walking out of her house when she began feeling lightheaded, rollover, had blurry vision and then lost consciousness.  She says she is concerned that she may be in DKA.  She denies having any vomiting or diarrhea.      PAST MEDICAL HISTORY  Past Medical History:   Diagnosis Date    Seizure     \"My blood sugar was high\"    Type 1 diabetes mellitus          PAST SURGICAL HISTORY  History reviewed. No pertinent surgical history.      FAMILY HISTORY  Family History   Problem Relation Age of Onset    Depression Mother     Diabetes Father     Diabetes Brother     Diabetes Maternal Uncle          SOCIAL HISTORY  Social History     Socioeconomic History    Marital status: Single   Tobacco Use    Smoking status: Never     Passive exposure: Never    Smokeless tobacco: Never   Vaping Use    Vaping status: Never Used   Substance and Sexual Activity    Alcohol use: Never    Drug use: Never    Sexual activity: Yes     Birth control/protection: Depo-provera         ALLERGIES  Latex        REVIEW OF SYSTEMS    All systems reviewed and negative except for those discussed in HPI.       PHYSICAL EXAM    I have reviewed the triage vital signs and nursing notes.    ED Triage Vitals   Temp Heart Rate Resp BP SpO2   25 2233 25 2233 25 2233 25 2236 25 2233   98.6 °F (37 °C) 93 18 144/91 " 98 %      Temp src Heart Rate Source Patient Position BP Location FiO2 (%)   06/08/25 2233 06/08/25 2233 06/08/25 2236 06/08/25 2236 --   Oral Monitor Sitting Left arm          General: no acute distress, well-appearing, non-toxic  Skin: normal color, warm and dry.  Patient has an area of concern for infection about the proximal posterior left arm.  There is no palpable fluctuance, induration, erythema or warmth in this area.  Head: normocephalic, atraumatic  Eyes: Pupils equally round and reactive to light.  Nose: normal nasal mucosa, no visible deformity.  Mouth: dry mucous membranes.  Neck: supple.  Chest: no retractions, no visible deformity  Cardiovascular: Regular rate and rhythm.  Lungs: clear to auscultation bilaterally.  Abdomen: soft, non-tender, non-distended. No rebound tenderness, no guarding.  No peritonitis.  Neuro:  alert and oriented x3, no focal neurological deficits.  Psych:  appropriate mood and behavior.        LAB RESULTS  Recent Results (from the past 24 hours)   Comprehensive Metabolic Panel    Collection Time: 06/08/25 11:12 PM    Specimen: Blood   Result Value Ref Range    Glucose 530 (C) 65 - 99 mg/dL    BUN 11.0 6.0 - 20.0 mg/dL    Creatinine 0.72 0.57 - 1.00 mg/dL    Sodium 138 136 - 145 mmol/L    Potassium 3.6 3.5 - 5.2 mmol/L    Chloride 101 98 - 107 mmol/L    CO2 14.5 (L) 22.0 - 29.0 mmol/L    Calcium 10.0 8.6 - 10.5 mg/dL    Total Protein 6.8 6.0 - 8.5 g/dL    Albumin 4.1 3.5 - 5.2 g/dL    ALT (SGPT) 10 1 - 33 U/L    AST (SGOT) 10 1 - 32 U/L    Alkaline Phosphatase 135 (H) 39 - 117 U/L    Total Bilirubin 0.6 0.0 - 1.2 mg/dL    Globulin 2.7 gm/dL    A/G Ratio 1.5 g/dL    BUN/Creatinine Ratio 15.3 7.0 - 25.0    Anion Gap 22.5 (H) 5.0 - 15.0 mmol/L    eGFR 123.7 >60.0 mL/min/1.73   CBC Auto Differential    Collection Time: 06/08/25 11:12 PM    Specimen: Blood   Result Value Ref Range    WBC 6.34 3.40 - 10.80 10*3/mm3    RBC 4.65 3.77 - 5.28 10*6/mm3    Hemoglobin 14.1 12.0 - 15.9 g/dL     Hematocrit 40.1 34.0 - 46.6 %    MCV 86.2 79.0 - 97.0 fL    MCH 30.3 26.6 - 33.0 pg    MCHC 35.2 31.5 - 35.7 g/dL    RDW 14.4 12.3 - 15.4 %    RDW-SD 44.5 37.0 - 54.0 fl    MPV 10.5 6.0 - 12.0 fL    Platelets 301 140 - 450 10*3/mm3    Neutrophil % 64.9 42.7 - 76.0 %    Lymphocyte % 25.9 19.6 - 45.3 %    Monocyte % 7.3 5.0 - 12.0 %    Eosinophil % 0.5 0.3 - 6.2 %    Basophil % 0.5 0.0 - 1.5 %    Immature Grans % 0.9 (H) 0.0 - 0.5 %    Neutrophils, Absolute 4.12 1.70 - 7.00 10*3/mm3    Lymphocytes, Absolute 1.64 0.70 - 3.10 10*3/mm3    Monocytes, Absolute 0.46 0.10 - 0.90 10*3/mm3    Eosinophils, Absolute 0.03 0.00 - 0.40 10*3/mm3    Basophils, Absolute 0.03 0.00 - 0.20 10*3/mm3    Immature Grans, Absolute 0.06 (H) 0.00 - 0.05 10*3/mm3    nRBC 0.0 0.0 - 0.2 /100 WBC   hCG, Serum, Qualitative    Collection Time: 06/08/25 11:12 PM    Specimen: Blood   Result Value Ref Range    HCG Qualitative Negative Negative   Phosphorus    Collection Time: 06/08/25 11:12 PM    Specimen: Blood   Result Value Ref Range    Phosphorus 3.4 2.5 - 4.5 mg/dL   Magnesium    Collection Time: 06/08/25 11:12 PM    Specimen: Blood   Result Value Ref Range    Magnesium 1.8 1.7 - 2.2 mg/dL   Hemoglobin A1c    Collection Time: 06/08/25 11:12 PM    Specimen: Blood   Result Value Ref Range    Hemoglobin A1C 12.10 (H) 4.80 - 5.60 %   Blood Gas, Venous With Co-Ox    Collection Time: 06/08/25 11:23 PM    Specimen: Venous Blood   Result Value Ref Range    Site OTHER     pH, Venous 7.328 7.320 - 7.420 pH Units    pCO2, Venous 34.9 (L) 40.0 - 50.0 mm Hg    pO2, Venous 22.7 (L) 30.0 - 50.0 mm Hg    HCO3, Venous 18.3 (L) 22.0 - 28.0 mmol/L    Base Excess, Venous -6.8 (L) 0.0 - 2.0 mmol/L    O2 Saturation, Venous 40.6 (L) 45.0 - 75.0 %    Oxyhemoglobin Venous 39.8 (L) 40.0 - 70.0 %    Methemoglobin Venous 0.8 0.0 - 3.0 %    Carboxyhemoglobin Venous 1.1 0.0 - 5.0 %    Barometric Pressure for Blood Gas 730 mmHg    Modality Room Air     Ventilator Mode NA      Notified [facility] MD     Notified By [id number]     Notified Time 06/08/2025 23:24    POC Glucose Once    Collection Time: 06/09/25 12:24 AM    Specimen: Blood   Result Value Ref Range    Glucose 470 (C) 70 - 130 mg/dL   Urinalysis With Microscopic If Indicated (No Culture) - Urine, Clean Catch    Collection Time: 06/09/25  1:27 AM    Specimen: Urine, Clean Catch   Result Value Ref Range    Color, UA Yellow Yellow, Straw    Appearance, UA Clear Clear    pH, UA 5.5 5.0 - 8.0    Specific Gravity, UA >=1.030 1.005 - 1.030    Glucose, UA >=1000 mg/dL (3+) (A) Negative    Ketones, UA >=160 mg/dL (4+) (A) Negative    Bilirubin, UA Negative Negative    Blood, UA Negative Negative    Protein, UA Negative Negative    Leuk Esterase, UA Negative Negative    Nitrite, UA Negative Negative    Urobilinogen, UA 0.2 E.U./dL 0.2 - 1.0 E.U./dL   Urine Drug Screen - Urine, Clean Catch    Collection Time: 06/09/25  1:27 AM    Specimen: Urine, Clean Catch   Result Value Ref Range    THC, Screen, Urine Negative Negative    Phencyclidine (PCP), Urine Negative Negative    Cocaine Screen, Urine Negative Negative    Methamphetamine, Ur Negative Negative    Opiate Screen Negative Negative    Amphetamine Screen, Urine Negative Negative    Benzodiazepine Screen, Urine Negative Negative    Tricyclic Antidepressants Screen Negative Negative    Methadone Screen, Urine Negative Negative    Barbiturates Screen, Urine Negative Negative    Oxycodone Screen, Urine Negative Negative    Buprenorphine, Screen, Urine Negative Negative   Fentanyl, Urine - Urine, Clean Catch    Collection Time: 06/09/25  1:27 AM    Specimen: Urine, Clean Catch   Result Value Ref Range    Fentanyl, Urine Negative Negative   POC Glucose Once    Collection Time: 06/09/25  1:52 AM    Specimen: Blood   Result Value Ref Range    Glucose 375 (H) 70 - 130 mg/dL   POC Glucose Once    Collection Time: 06/09/25  2:54 AM    Specimen: Blood   Result Value Ref Range    Glucose 291 (H) 70 - 130  mg/dL   POC Glucose Once    Collection Time: 06/09/25  3:57 AM    Specimen: Blood   Result Value Ref Range    Glucose 219 (H) 70 - 130 mg/dL       If labs were ordered, I independently reviewed the results and considered them in treating the patient.  See medical decision making discussion section for my interpretation of lab results.        RADIOLOGY  No Radiology Exams Resulted Within Past 24 Hours    I ordered and independently reviewed the above noted radiographic studies.  See radiologist's dictation for official interpretation.    Per my independent reading: Chest radiograph is obtained and is negative for acute cardiopulmonary findings based on my independent reading.            PROCEDURES    Procedures    ECG 12 Lead Syncope    (Results Pending)       MEDICATIONS GIVEN IN ER    Medications   sodium chloride 0.9 % flush 10 mL (has no administration in time range)   sodium chloride 0.9 % flush 10 mL (10 mL Intravenous Given 6/9/25 0025)   sodium chloride 0.9 % flush 10 mL (has no administration in time range)   sodium chloride 0.9 % infusion 40 mL (has no administration in time range)   dextrose (GLUTOSE) oral gel 15 g (has no administration in time range)   dextrose (D50W) (25 g/50 mL) IV injection 10-50 mL (has no administration in time range)   glucagon (GLUCAGEN) injection 1 mg (has no administration in time range)   sodium chloride 0.9 % bolus (0 mL/hr Intravenous Stopped 6/9/25 0130)   sodium chloride 0.9 % infusion (has no administration in time range)   sodium chloride 0.9 % with KCl 20 mEq/L infusion (has no administration in time range)   sodium chloride 0.9 % with KCl 40 mEq/L infusion (has no administration in time range)   dextrose 5 % and sodium chloride 0.9 % infusion (has no administration in time range)   dextrose 5 % and sodium chloride 0.9 % with KCl 20 mEq/L infusion (has no administration in time range)   dextrose 5 % and sodium chloride 0.9 % with KCl 40 mEq/L infusion (has no  administration in time range)   sodium chloride 0.45 % infusion (has no administration in time range)   sodium chloride 0.45 % with KCl 20 mEq/L infusion (0 mL/hr Intravenous Stopped 6/9/25 0409)   sodium chloride 0.45 % 1,000 mL with potassium chloride 40 mEq infusion (has no administration in time range)   dextrose 5 % and sodium chloride 0.45 % infusion (has no administration in time range)   dextrose 5 % and sodium chloride 0.45 % with KCl 20 mEq/L infusion (125 mL/hr Intravenous New Bag 6/9/25 0409)   dextrose 5 % and sodium chloride 0.45 % with KCl 40 mEq/L infusion (has no administration in time range)   insulin regular 1 unit/mL in 0.9% sodium chloride (Glucommander) (1.6 Units/hr Intravenous Rate Change (DUAL SIGN) 6/9/25 0358)   Potassium Replacement - Follow Nurse / BPA Driven Protocol (has no administration in time range)   Magnesium Standard Dose Replacement - Follow Nurse / BPA Driven Protocol (has no administration in time range)   Phosphorus Replacement - Follow Nurse / BPA Driven Protocol (has no administration in time range)   Calcium Replacement - Follow Nurse / BPA Driven Protocol (has no administration in time range)   nitroglycerin (NITROSTAT) SL tablet 0.4 mg (has no administration in time range)   sodium chloride 0.9 % flush 10 mL (10 mL Intravenous Given 6/9/25 0051)   sodium chloride 0.9 % flush 10 mL (has no administration in time range)   sodium chloride 0.9 % infusion 40 mL (has no administration in time range)   mupirocin (BACTROBAN) 2 % nasal ointment 1 Application (1 Application Each Nare Given 6/9/25 0256)   sennosides-docusate (PERICOLACE) 8.6-50 MG per tablet 2 tablet (2 tablets Oral Not Given 6/9/25 0245)     And   polyethylene glycol (MIRALAX) packet 17 g (has no administration in time range)     And   bisacodyl (DULCOLAX) EC tablet 5 mg (has no administration in time range)     And   bisacodyl (DULCOLAX) suppository 10 mg (has no administration in time range)   acetaminophen  (TYLENOL) tablet 650 mg (650 mg Oral Given 6/9/25 0256)     Or   acetaminophen (TYLENOL) suppository 325 mg ( Rectal Not Given:  See Alt 6/9/25 0256)   ondansetron ODT (ZOFRAN-ODT) disintegrating tablet 4 mg (has no administration in time range)     Or   ondansetron (ZOFRAN) injection 4 mg (has no administration in time range)   potassium chloride 10 mEq in 100 mL IVPB (10 mEq Intravenous New Bag 6/9/25 0410)   sodium chloride 0.9 % bolus 1,000 mL (0 mL Intravenous Stopped 6/9/25 0127)         MEDICAL DECISION MAKING, PROGRESS, and CONSULTS    All labs, if obtained, have been independently reviewed by me.  All radiology studies, if obtained, have been reviewed by me and the radiologist dictating the report.  All EKG's, if obtained, have been independently viewed and interpreted by me/my attending physician.      Discussion below represents my analysis of pertinent findings related to patient's condition, differential diagnosis, treatment plan and final disposition.                         Differential diagnosis:    Differential includes DKA, HHS, dehydration, cardiac dysrhythmia, ectopic pregnancy, hypoglycemia, other acute emergency.    Medical Decision Making Discussion:    Triage vitals reviewed and are normal.    EKG is obtained and based on my independent reading demonstrates normal sinus rhythm without acute ischemic changes.  Patient has normal UT, QRS and QT intervals.    Labs consistent with DKA.  Pregnancy test negative.    Insulin drip initiated.  Case discussed with Dr. Walker who accepted the patient for hospitalization.    50 minutes of critical care provided. This time excludes other billable procedures. Time does include preparation of documents, medical consultations, review of old records, and direct bedside care. Patient is at high risk for life-threatening deterioration due to DKA.      Additional sources:    - External (non-ED) record review: Discharge summary from May 9, 2025 documenting  history of type 1 diabetes with DKA.    - Chronic or social conditions impacting care: Type 1 diabetes    Shared Decision Making:  After my consideration of clinical presentation and any laboratory/radiology studies obtained, I discussed the findings with the patient/patient representative who is in agreement with the treatment plan and the final disposition.   Risks and benefits of discharge and/or observation/admission were discussed.    Orders placed during this visit:  Orders Placed This Encounter   Procedures    XR Chest 1 View    Comprehensive Metabolic Panel    CBC Auto Differential    Blood Gas, Venous -With Co-Ox Panel: Yes    hCG, Serum, Qualitative    Urinalysis With Microscopic If Indicated (No Culture) - Urine, Clean Catch    Blood Gas, Venous With Co-Ox    Phosphorus    Magnesium    Osmolality, Serum    Hemoglobin A1c    Basic Metabolic Panel    Magnesium    Phosphorus    Beta Hydroxybutyrate Quantitative    Urine Drug Screen - Urine, Clean Catch    Fentanyl, Urine - Urine, Clean Catch    Potassium    NPO Diet NPO Type: Strict NPO, Ice Chips, Sips with Meds    Vital Signs    Strict Intake & Output    Daily Weights    Corrected Serum Sodium = Measured Sodium + [1.6 x ((Glucose - 100)/100)]    Do NOT Discontinue Insulin Infusion Until 2 Hours After First Dose of Basal SQ Insulin    Prior to Initiating Glucommander™, Ensure All Prior Insulin Orders Are Discontinued    Do Not Start Insulin Infusion if Potassium < 3.3    Use a Dedicated Line for Insulin Infusion (If Possible).  May Use a Carrier Fluid of NS at KVO Rate if Insulin Rate is Insufficient to Maintain IV Patency.  Prime IV Line With Insulin Infusion    Glucommander Must Be Discontinued if Insulin Infusion is Discontinued.  If Insulin Infusion is Restarted, Previous Glucommander Settings Must Be Discontinued and Re-Entered From New Order    Once DKA Meets Resolution Criteria - Call Provider for Transition Orders From IV to SQ Insulin    Utilize  the Start Meal Feature / Meal Bolus Feature in Glucommander if Patient Starts a Diet or Bolus Tube Feedings    Notify Provider - Insulin Infusion    RN to Release PRN POC Glucose [facility]    RN to Order STAT Glucose For Any POC Glucose <10 or >600    If Insulin Infusion is Paused - Follow Glucommander Instructions    DKA / HHS Patients - Phosphorus of 1 mg/dL or Higher Does NOT Require Replacement (While Insulin Infusing)    Vital Signs Every Hour and Per Hospital Policy Based on Patient Condition    Telemetry - Place Orders & Notify Provider of Results When Patient Experiences Acute Chest Pain, Dysrhythmia or Respiratory Distress    Continuous Pulse Oximetry    Height & Weight    Daily Weights    Intake & Output    Oral Care - Patient Not on NPPV & Not Intubated    Target Arousal Level RASS 0 to -2    Use Mobility Guidelines for Advancement of Activity    Saline Lock & Maintain IV Access    Daily CHG Bath While in Critical Care    Place Sequential Compression Device    Maintain Sequential Compression Device    Code Status and Medical Interventions: CPR (Attempt to Resuscitate); Full Support    Inpatient Diabetes Educator Consult    Patient is on Glucommander    Oxygen Therapy- Nasal Cannula; Titrate 1-6 LPM Per SpO2; 90 - 95%    POC Glucose PRN    POC Glucose Once    POC Glucose Once    POC Glucose Once    POC Glucose Once    ECG 12 Lead Syncope    Insert Peripheral IV    Insert Peripheral IV x2    Insert Peripheral IV    Initiate Observation Status       AS OF 04:24 EDT VITALS:    BP - 106/76  HR - 72  TEMP - 98.6 °F (37 °C) (Oral)  O2 SATS - 96%                  DIAGNOSIS  Final diagnoses:   Diabetic ketoacidosis without coma associated with type 1 diabetes mellitus         DISPOSITION  Admit      Please note that portions of this document were completed with voice recognition software.        Manish Palomo MD  06/09/25 0424

## 2025-06-09 NOTE — CASE MANAGEMENT/SOCIAL WORK
Discharge Planning Assessment  [facility]     Patient Name: Chanell Dean  MRN: [id number]  Today's Date: 6/9/2025    Admit Date: 6/8/2025    Plan: Possible shelter   Discharge Needs Assessment       Row Name 06/09/25 1520       Living Environment    People in Home other (see comments)  Homeless    Primary Care Provided by self    Provides Primary Care For no one    Family Caregiver if Needed none    Quality of Family Relationships unable to assess    Able to Return to Prior Arrangements no  Can't return home       Transition Planning    Patient/Family Anticipates Transition to shelter    Patient/Family Anticipated Services at Transition     Transportation Anticipated public transportation       Discharge Needs Assessment    Readmission Within the Last 30 Days no previous admission in last 30 days    Concerns to be Addressed discharge planning                   Discharge Plan       Row Name 06/09/25 1522       Plan    Plan Probable shelter    Patient/Family in Agreement with Plan yes    Plan Comments Spoke to patient at bedside to initiate discharge planning. Patient states she cannot return home with her mother and boyfriend. Police involved. Patient does not use DME and states she has been borrowing a glucometer from a friend. She recently moved back to [place] and does not have a PCP, but states she has an upcoming appointment at [facility]. Provider unknown. She uses [facility]. Updated MSW Nani. Gave [facility] resources to patient at bedside. Plan will likely be a shelter. CM will continue to follow.    Final Discharge Disposition Code 01 - home or self-care                       Demographic Summary       Row Name 06/09/25 1519       General Information    Admission Type observation    Arrived From emergency department    Referral Source admission list    Reason for Consult discharge planning                   Functional Status       Row Name 06/09/25 1519       Functional Status    Usual  Activity Tolerance good    Current Activity Tolerance good       Functional Status, IADL    Medications independent    Meal Preparation independent    Housekeeping independent    Laundry independent    Shopping independent                   Psychosocial    No documentation.                  Abuse/Neglect    No documentation.                  Legal    No documentation.                  Substance Abuse    No documentation.                  Patient Forms    No documentation.                     Henna Cruz RN

## 2025-06-09 NOTE — PAYOR COMM NOTE
"TO:[employer]  FROM:CHACHA PATE, RN PHONE [phone]  OBSERVATION NOTIFICATION  TAX ID [id number] NPI [id number]    Juvenal Shields (19 y.o. Female)       Date of Birth   2005    Social Security Number       Address   [address]  [place] [address] [place] [id number]    Home Phone   [phone]    MRN   [id number]       Anglican   None    Marital Status   Single                            Admission Date   6/8/2025    Admission Type   Emergency    Admitting Provider   Malinda Walker DO    Attending Provider   Jay Sanders DO    Department, Room/Bed   [facility] INTENSIVE CARE, I02/1       Discharge Date       Discharge Disposition       Discharge Destination                                 Attending Provider: Jay Sanders DO    Allergies: Latex    Isolation: None   Infection: None   Code Status: CPR    Ht: 161.3 cm (63.5\")   Wt: 71.1 kg (156 lb 12 oz)    Admission Cmt: None   Principal Problem: DKA (diabetic ketoacidosis) [E11.10]                   Active Insurance as of 6/8/2025       Primary Coverage       Payor Plan Insurance Group Employer/Plan Group    [employer] [employer]        Payor Plan Address Payor Plan Phone Number Payor Plan Fax Number Effective Dates    [id number]   4/1/2022 - None Entered    [facility] [id number]         Subscriber Name Subscriber Birth Date Member ID       JUVENAL SHIELDS 2005 [id number]                     Emergency Contacts        (Rel.) Home Phone Work Phone Mobile Phone    ALYSONSAMAN CHAVEZ (Mother) -- -- [phone]              Insurance Information                  [employer] [place] [place] Phone: --    Subscriber: Juvenal Shields Subscriber#: [id number]    Group#: -- Precert#: --    Authorization#: NPR Effective Date: --          "

## 2025-06-09 NOTE — H&P
[facility]ORY AND [facility]YSICAL      Name:  Chanell Dean   Age:  19 y.o.  Sex:  female  :  2005  MRN:  [id number]   Visit Number:  [id number]  Admission Date:  2025  Date Of Service:  25  Primary Care Physician:  Provider, No Known    Chief Complaint:     Nausea, high glucose, dehydrated    History Of Presenting Illness:      19-year-old female with a history of insulin-dependent diabetes, anxiety, who presented to the emergency room with complaints of weakness, dizziness, dehydration and high glucose.  States has a history of uncontrolled glucose, DKA in the past.  Feels dehydrated.  Sugars been running in the 300 range at home or more.  Recently moved back to [place], does not currently have a doctor here.  Previously saw [facility] endocrinology at .  She denies any abdominal pain or vomiting.  No changes in bowel.  No chest pain palpitations or shortness of breath.    Of note, RN noted patient had indicated some issues with her current living conditions, police have been contacted prior to my evaluation.  Not sure of all the details.    In the ER she was hemodynamically stable.  She had evidence of mild metabolic acidosis, hyperglycemia.  Normal kidney function.  Chest x-ray unremarkable.  She had been ordered IV fluid resuscitation was started on Glucomander for DKA.    Review Of Systems:    All systems were reviewed and negative except as mentioned in history of presenting illness, assessment and plan.    Past Medical History: Patient  has a past medical history of Type 1 diabetes mellitus (2018).    Past Surgical History: Patient  has no past surgical history on file.    Social History: Patient  reports that she has never smoked. She has never been exposed to tobacco smoke. She has never used smokeless tobacco. She reports that she does not drink alcohol and does not use drugs.    Family History:  Patient's family history has been reviewed and found to  be noncontributory.     Allergies:      Latex    Home Medications:    Prior to Admission Medications       Prescriptions Last Dose Informant Patient Reported? Taking?    Continuous Glucose Transmitter (Dexcom G6 Transmitter) misc   Yes No    USE AS DIRECTED AND CHANGE EVERY 3 MONTHS    FLUoxetine (PROzac) 10 MG capsule   Yes No    TAKE 1 CAPSULE ONCE A DAY    guanFACINE HCl ER 2 MG tablet sustained-release 24 hour   Yes No    Take 1 tablet by mouth Daily.    hydrOXYzine pamoate (VISTARIL) 25 MG capsule   Yes No    TAKE 1 CAPSULE AT BEDTIME AS NEEDED    Insulin Lispro, 1 Unit Dial, (HUMALOG) 100 UNIT/ML solution pen-injector   Yes No    INJECT 1 UNIT FOR EVERY 5G CARB AND AS NEEDED FOR HYPERGLYCEMIA.  UNITS    Tresiba FlexTouch 200 UNIT/ML solution pen-injector pen injection   Yes No    INJECT 42 UNITS UNDER THE SKIN 1 TIME DAILY WITH INSULIN PUMP MALFUNCTION          ED Medications:    Medications   sodium chloride 0.9 % flush 10 mL (has no administration in time range)   sodium chloride 0.9 % flush 10 mL (10 mL Intravenous Given 6/9/25 0025)   sodium chloride 0.9 % flush 10 mL (has no administration in time range)   sodium chloride 0.9 % infusion 40 mL (has no administration in time range)   dextrose (GLUTOSE) oral gel 15 g (has no administration in time range)   dextrose (D50W) (25 g/50 mL) IV injection 10-50 mL (has no administration in time range)   glucagon (GLUCAGEN) injection 1 mg (has no administration in time range)   sodium chloride 0.9 % bolus (1,000 mL/hr Intravenous New Bag 6/9/25 0026)   sodium chloride 0.9 % infusion (has no administration in time range)   sodium chloride 0.9 % with KCl 20 mEq/L infusion (has no administration in time range)   sodium chloride 0.9 % with KCl 40 mEq/L infusion (has no administration in time range)   dextrose 5 % and sodium chloride 0.9 % infusion (has no administration in time range)   dextrose 5 % and sodium chloride 0.9 % with KCl 20 mEq/L infusion (has no  "administration in time range)   dextrose 5 % and sodium chloride 0.9 % with KCl 40 mEq/L infusion (has no administration in time range)   sodium chloride 0.45 % infusion (has no administration in time range)   sodium chloride 0.45 % with KCl 20 mEq/L infusion (has no administration in time range)   sodium chloride 0.45 % 1,000 mL with potassium chloride 40 mEq infusion (has no administration in time range)   dextrose 5 % and sodium chloride 0.45 % infusion (has no administration in time range)   dextrose 5 % and sodium chloride 0.45 % with KCl 20 mEq/L infusion (has no administration in time range)   dextrose 5 % and sodium chloride 0.45 % with KCl 40 mEq/L infusion (has no administration in time range)   insulin regular 1 unit/mL in 0.9% sodium chloride (Glucommander) (has no administration in time range)   Potassium Replacement - Follow Nurse / BPA Driven Protocol (has no administration in time range)   Magnesium Standard Dose Replacement - Follow Nurse / BPA Driven Protocol (has no administration in time range)   Phosphorus Replacement - Follow Nurse / BPA Driven Protocol (has no administration in time range)   Calcium Replacement - Follow Nurse / BPA Driven Protocol (has no administration in time range)   sodium chloride 0.9 % bolus 1,000 mL (1,000 mL Intravenous New Bag 6/8/25 2313)     Vital Signs:  Temp:  [98.6 °F (37 °C)] 98.6 °F (37 °C)  Heart Rate:  [93] 93  Resp:  [18] 18  BP: (144)/(91) 144/91        06/08/25  2233   Weight: 72.1 kg (159 lb)     Body mass index is 28.17 kg/m².    Physical Exam:     Most recent vital Signs: /91 (BP Location: Left arm, Patient Position: Sitting)   Pulse 93   Temp 98.6 °F (37 °C) (Oral)   Resp 18   Ht 160 cm (63\")   Wt 72.1 kg (159 lb)   SpO2 98%   BMI 28.17 kg/m²     Physical Exam  Constitutional:       General: She is not in acute distress.     Appearance: She is not toxic-appearing.   HENT:      Nose: Nose normal.      Mouth/Throat:      Mouth: Mucous " "membranes are dry.   Cardiovascular:      Rate and Rhythm: Normal rate and regular rhythm.      Pulses: Normal pulses.      Heart sounds: No murmur heard.     No gallop.   Pulmonary:      Effort: Pulmonary effort is normal. No respiratory distress.      Breath sounds: No wheezing.   Abdominal:      General: Bowel sounds are normal. There is no distension.      Tenderness: There is no abdominal tenderness. There is no guarding.   Musculoskeletal:         General: Normal range of motion.      Right lower leg: No edema.      Left lower leg: No edema.   Skin:     General: Skin is warm.      Capillary Refill: Capillary refill takes less than 2 seconds.   Neurological:      General: No focal deficit present.      Mental Status: She is alert. Mental status is at baseline.   Psychiatric:         Thought Content: Thought content normal.         Laboratory data:    I have reviewed the labs done in the emergency room.    Results from last 7 days   Lab Units 06/08/25  2312   SODIUM mmol/L 138   POTASSIUM mmol/L 3.6   CHLORIDE mmol/L 101   CO2 mmol/L 14.5*   BUN mg/dL 11.0   CREATININE mg/dL 0.72   CALCIUM mg/dL 10.0   BILIRUBIN mg/dL 0.6   ALK PHOS U/L 135*   ALT (SGPT) U/L 10   AST (SGOT) U/L 10   GLUCOSE mg/dL 530*     Results from last 7 days   Lab Units 06/08/25  2312   WBC 10*3/mm3 6.34   HEMOGLOBIN g/dL 14.1   HEMATOCRIT % 40.1   PLATELETS 10*3/mm3 301                                   Invalid input(s): \"USDES\", \"NITRITITE\", \"BACT\", \"EP\"    Pain Management Panel           No data to display                EKG:          Radiology:    No radiology results for the last 3 days    Assessment:    Mild DKA in setting of type 1 diabetes, poorly controlled, POA  Dehydration    Plan:    DKA:  Fairly mild, vital signs stable.  Anticipate quick correction of DKA with fluids and insulin drip.  Primary issue was likely some dehydration.  Do suspect some social issues are contributing to patient's poor diabetes management.  A1c noted to " be greater than 12.  She would likely be a good candidate for insulin pump if has adequate follow-up with endocrinology.  She notes she is due to get back in with [facility] in [place].  Diabetic educator consult prior to discharge recommended.   consult prior to discharge.    Suspect patient can be discharged later today.    Risk Assessment: High  DVT Prophylaxis: SCD  Code Status: Full  Diet: N.p.o.    Advance Care Planning   ACP discussion was held with the patient during this visit. Patient does not have an advance directive, declines further assistance.           Malinda Walker DO  06/09/25  00:38 EDT    Dictated utilizing Dragon dictation.

## 2025-06-09 NOTE — CONSULTS
"Diabetes Education  Assessment/Teaching    Patient Name:  Chanell Dean  YOB: 2005  MRN: [id number]  Admit Date:  6/8/2025      Assessment Date:  6/9/2025  Flowsheet Row Most Recent Value   General Information     Height 161.3 cm (63.5\")   Height Method Stated   Weight 71.1 kg (156 lb 12 oz)   Weight Method Bed scale   Pregnancy Assessment    Diabetes History    What type of diabetes do you have? Type 1   Length of Diabetes Diagnosis 6 - 10 years  [Pt. diagnosed at age 12.]   Current DM knowledge fair   Have you had diabetes education/teaching in the past? yes   When and where was your diabetes education? [facility] [place]   Do you test your blood sugar at home? yes   Frequency of checks every 3 hours.   Meter type unknown.  pt. states that she does not have the right \"equipment\" (? phone) for Dexcom and states that her insurance provider will not cover Freestyle Osei   Who performs the test? self   Typical readings states that BG is always in the 200's.   Have you had low blood sugar? (<70mg/dl) no   Have you had high blood sugar? (>140mg/dl) yes   How often do you have high blood sugar? frequently   When was your last high blood sugar? on admit 530   Do you have any diabetes complications? --  [pt. states that she has DKA frequently with last diagnosis 1 month ago in [place]]   Education Preferences    What areas of diabetes would you like to learn about? avoiding high blood sugar, diabetes complications, medications for diabetes, testing my blood sugar at home   Nutrition Information    Are you currently following a special meal plan? --  [pt. should be following Insulin to carb of 1u:5 grams of carbs.  pt. asked if she is always calculating carbs and taking insulin coverage.  she states yes.]   Assessment Topics    Healthy Eating - Assessment Needs education  [pt. provided with Healthy Plate for good nutrition guidance.  Pt. states that she is doing I:C ratio, however with " frequent DKA.  I am not certain that she is taking insulin as ordered.]   Taking Medication - Assessment Needs education  [Pt. states that she is not having any trouble affordi ng her insulin or missing doses,but does state that she will sometimes ration doses.  Pt. denies transportation issues to get her insulin.  Pt. states that she never misses doses]   Problem Solving - Assessment Needs education   Reducing Risk - Assessment Needs education   Healthy Coping - Assessment N/A-unable to assess   Monitoring - Assessment Competent   DM Goals             Flowsheet Row Most Recent Value   DM Education Needs    Meter Has own   Frequency of Testing Other (comment)  [every 3 hours.  Asked pt. why she was not using CGM.  Stated that inititally taken away as umbers were not accurate and then that she did not have the equipment for Dexcom and  Freestyle was not covered by her insurance.]   Blood Glucose Target 150   Blood Glucose Target Range  fasting and 2 hours post meal no higher than 180   Medication Insulin, Pen   Problem Solving Hyperglycemia, Signs, Symptoms, Treatment  [Discussed that her A1c was 12.10%.  discussed that she has DKA all the tuime.  Discussed DKA typically related to not getting enough insulin .  Pt. cannot identify anytghing that has changed in her self management over the past 2 years causing DKA]   Reducing Risks --  [pt. needs to establish PCP.  pt. identifies wanting to go back to [facility] for her Diabetes Care.]   Healthy Eating Basic meal plan provided  [Discussed need to count all carbs for I:C ratio]   Healthy Coping Other (comment)  [unable to assess. pt.. really not engaging in education]   Discharge Plan --  [pending]   Motivation Not interested   Teaching Method Explanation, Discussion, Handouts   Patient Response Verbalized understanding              Other Comments:  DM education referral related to mild DKA.  Pt. Is a 19 y.o female with a history of type 1  DM since the age of 12.  Pt. States that she last followed with [facility] in October 2024 seeing provider Carole landeros.  Pt. States that she has had elevated A1c's and frequent DKA the past 2 years.  Last hospitalization last month while living in [place].  Pt. States that she has moved back to [place] and lives with her Mom and her moms boyfriend.  Pt. Is not forthcoming or engaging in DM educations assessment or education.  Pt. States that she is doing a correction of 1u:20>150 and initially does not know her I:C which is listed as 1u:5 grams of CHO .  Pt. Is taking lantus 50 units once daily.  Pt. States that she always takes her correction and I:C and her Lantus and cannot explain what she might think is causing frequent hyperglycemia and DKA.  Pt. Was left with material on checking her Ketones and avoiding DKA.  Pt. Denies questions and concerns pt. Was also provided with my contact information should she have questions or concerns in the future.           Electronically signed by:  Lucy Bills RN  06/09/25 14:11 EDT

## 2025-06-09 NOTE — PLAN OF CARE
Goal Outcome Evaluation:         VSS. Pt being discharged home today. Lucy with diabetes education spoke with patient this shift.

## 2025-06-09 NOTE — PROGRESS NOTES
[facility]ITAL[facility]    PROGRESS NOTE    Name:  Chanell Dean   Age:  19 y.o.  Sex:  female  :  2005  MRN:  [id number]   Visit Number:  [id number]  Admission Date:  2025  Date Of Service:  25  Primary Care Physician:  Provider, No Known     LOS: 0 days :    Chief Complaint:      dka    Subjective:    25:Admitting provider documentation reviewed. AG resolved.    History Of Presenting Illness:       19-year-old female with a history of insulin-dependent diabetes, anxiety, who presented to the emergency room with complaints of weakness, dizziness, dehydration and high glucose.  States has a history of uncontrolled glucose, DKA in the past.  Feels dehydrated.  Sugars been running in the 300 range at home or more.  Recently moved back to [place], does not currently have a doctor here.  Previously saw [facility] at .  She denies any abdominal pain or vomiting.  No changes in bowel.  No chest pain palpitations or shortness of breath.     Of note, RN noted patient had indicated some issues with her current living conditions, police have been contacted prior to my evaluation.  Not sure of all the details.     In the ER she was hemodynamically stable.  She had evidence of mild metabolic acidosis, hyperglycemia.  Normal kidney function.  Chest x-ray unremarkable.  She had been ordered IV fluid resuscitation was started on Glucomander for DKA.  Edited by: Jay Sanders DO at 2025 1120     Review of Systems:     All systems were reviewed and negative except as mentioned in subjective, assessment and plan.    Vital Signs:    Temp:  [97.9 °F (36.6 °C)-98.6 °F (37 °C)] 97.9 °F (36.6 °C)  Heart Rate:  [] 96  Resp:  [8-22] 8  BP: ()/(57-95) 87/69    Intake and output:    I/O last 3 completed shifts:  In: 1.5 [P.O.:75; I.V.:641.5; IV Piggyback:1365]  Out: -   No intake/output data recorded.    Physical Examination:    Constitutional: No acute  "distress, awake, alert  HENT: NCAT, mucous membranes dry  Respiratory: Clear to auscultation bilaterally, respiratory effort normal   Cardiovascular: RRR, no murmurs, rubs, or gallops  Gastrointestinal: Positive bowel sounds, soft, nontender, nondistended  Musculoskeletal: No bilateral ankle edema  Psychiatric: Appropriate affect, cooperative  Neurologic: Oriented x 3, speech clear  Skin: No rashes  Edited by: Jay Sanders DO at 6/9/2025 0707     Laboratory results:    Results from last 7 days   Lab Units 06/09/25  0807 06/09/25  0408 06/08/25  2312   SODIUM mmol/L 139 138 138   POTASSIUM mmol/L 3.3* 3.4* 3.6   CHLORIDE mmol/L 112* 111* 101   CO2 mmol/L 18.3* 17.3* 14.5*   BUN mg/dL 7.0 9.0 11.0   CREATININE mg/dL 0.32* 0.37* 0.72   CALCIUM mg/dL 8.4* 8.4* 10.0   BILIRUBIN mg/dL  --   --  0.6   ALK PHOS U/L  --   --  135*   ALT (SGPT) U/L  --   --  10   AST (SGOT) U/L  --   --  10   GLUCOSE mg/dL 181* 243* 530*     Results from last 7 days   Lab Units 06/08/25  2312   WBC 10*3/mm3 6.34   HEMOGLOBIN g/dL 14.1   HEMATOCRIT % 40.1   PLATELETS 10*3/mm3 301                 No results for input(s): \"PHART\", \"NKH9GBY\", \"PO2ART\", \"ADM3VSN\", \"BASEEXCESS\" in the last 8760 hours.   I have reviewed the patient's laboratory results.    Radiology results:    XR Chest 1 View  Result Date: 6/9/2025  PROCEDURE: XR CHEST 1 VW-  HISTORY: Assess for Fluid Overload, near syncope, high blood sugar  COMPARISON: None.  FINDINGS: The heart is normal in size. Question of faint right infrahilar opacity; this may represent overlying soft tissue. Left lung is clear.. The mediastinum is unremarkable. There is no pneumothorax. There are no acute osseous abnormalities. Apical lordotic positioning noted.      Impression: Question faint right infrahilar opacity versus overlying soft tissue; small focus of pneumonitis not excluded. Follow-up may be helpful..     This report was signed and finalized on 6/9/2025 8:06 AM by Rupal Jones MD.  "     I have reviewed the patient's radiology reports.    Medication Review:     I have reviewed the patient's active and prn medications.     Problem List:      DKA (diabetic ketoacidosis)      Assessment/Plan:    Mild DKA in setting of type 1 diabetes, poorly controlled, POA  Dehydration     Plan:     DKA:  Fairly mild, vital signs stable.  Anticipate quick correction of DKA with fluids and insulin drip.  Primary issue was likely some dehydration.  Do suspect some social issues are contributing to patient's poor diabetes management.  A1c noted to be greater than 12.  She would likely be a good candidate for insulin pump if has adequate follow-up with endocrinology.  She notes she is due to get back in with [facility] in [place].  Diabetic educator consult prior to discharge recommended.   consult prior to discharge.       DVT Prophylaxis: SCD  Code Status: Full  Diet: N.p.o.  Disposition: Anticipate home today.  Edited by: Jay Sanders DO at 6/9/2025 0707           Jay Sanders DO  06/09/25  11:20 EDT    Dictated utilizing Dragon dictation.

## 2025-06-30 ENCOUNTER — TELEPHONE (OUTPATIENT)
Dept: DIABETES SERVICES | Facility: HOSPITAL | Age: 20
End: 2025-06-30
Payer: COMMERCIAL

## 2025-07-01 ENCOUNTER — TELEPHONE (OUTPATIENT)
Dept: DIABETES SERVICES | Facility: HOSPITAL | Age: 20
End: 2025-07-01
Payer: COMMERCIAL

## 2025-07-01 NOTE — TELEPHONE ENCOUNTER
Pt. Had called Lucy Sanz on 06/30/25 around 7 PM inquiring about a new BGM.  I have attempted to reach pt. At 10:20 AM and again at 4:10 PM and there has been no answer and no VM setup to leave a message with pt.   78

## 2025-07-03 ENCOUNTER — DOCUMENTATION (OUTPATIENT)
Dept: DIABETES SERVICES | Facility: HOSPITAL | Age: 20
End: 2025-07-03
Payer: COMMERCIAL

## 2025-07-03 ENCOUNTER — TELEPHONE (OUTPATIENT)
Dept: DIABETES SERVICES | Facility: HOSPITAL | Age: 20
End: 2025-07-03
Payer: COMMERCIAL

## 2025-07-03 NOTE — TELEPHONE ENCOUNTER
Discussed that pt. Could contact her provider in [place] and see if he could send a script.  Discussed option of purchasing the Reklion kit at [facility] for 20.00 dollars.  Discussed that I was more than happy to give her a sample BGM however it only comes with 10 strips and she should be checking BG 6-7 times a day so she would have to purchase her strips OTC which may be quite expensive.  Pt. States that she would like the sample meter.  Discussed to come to the office tomorrow at 0830 and I would provide her with the BGM.  Directions given to my office.  I asked that she calls before she comes to ensure that I am in the office.

## 2025-07-14 ENCOUNTER — HOSPITAL ENCOUNTER (EMERGENCY)
Facility: HOSPITAL | Age: 20
Discharge: HOME | End: 2025-07-14
Attending: STUDENT IN AN ORGANIZED HEALTH CARE EDUCATION/TRAINING PROGRAM | Admitting: STUDENT IN AN ORGANIZED HEALTH CARE EDUCATION/TRAINING PROGRAM
Payer: COMMERCIAL

## 2025-07-14 VITALS
SYSTOLIC BLOOD PRESSURE: 131 MMHG | OXYGEN SATURATION: 98 % | HEART RATE: 98 BPM | DIASTOLIC BLOOD PRESSURE: 92 MMHG | BODY MASS INDEX: 28.17 KG/M2 | HEIGHT: 64 IN | WEIGHT: 165 LBS | TEMPERATURE: 98.1 F | RESPIRATION RATE: 18 BRPM

## 2025-07-14 DIAGNOSIS — A60.04 HERPES SIMPLEX VULVOVAGINITIS: Primary | ICD-10-CM

## 2025-07-14 LAB
BACTERIA UR QL AUTO: ABNORMAL /HPF
BILIRUB UR QL STRIP: NEGATIVE
CLARITY UR: CLEAR
COLOR UR: YELLOW
GLUCOSE UR STRIP-MCNC: ABNORMAL MG/DL
HGB UR QL STRIP.AUTO: ABNORMAL
HYALINE CASTS UR QL AUTO: ABNORMAL /LPF
KETONES UR QL STRIP: ABNORMAL
LEUKOCYTE ESTERASE UR QL STRIP.AUTO: NEGATIVE
NITRITE UR QL STRIP: NEGATIVE
PH UR STRIP.AUTO: 5.5 [PH] (ref 5–8)
PROT UR QL STRIP: ABNORMAL
RBC # UR STRIP: ABNORMAL /HPF
REF LAB TEST METHOD: ABNORMAL
SP GR UR STRIP: >=1.03 (ref 1–1.03)
SQUAMOUS #/AREA URNS HPF: ABNORMAL /HPF
UROBILINOGEN UR QL STRIP: ABNORMAL
WBC # UR STRIP: ABNORMAL /HPF

## 2025-07-14 PROCEDURE — 81001 URINALYSIS AUTO W/SCOPE: CPT | Performed by: STUDENT IN AN ORGANIZED HEALTH CARE EDUCATION/TRAINING PROGRAM

## 2025-07-14 PROCEDURE — 99283 EMERGENCY DEPT VISIT LOW MDM: CPT | Performed by: STUDENT IN AN ORGANIZED HEALTH CARE EDUCATION/TRAINING PROGRAM

## 2025-07-14 PROCEDURE — 87255 GENET VIRUS ISOLATE HSV: CPT | Performed by: STUDENT IN AN ORGANIZED HEALTH CARE EDUCATION/TRAINING PROGRAM

## 2025-07-14 RX ORDER — ACYCLOVIR 400 MG/1
400 TABLET ORAL 4 TIMES DAILY
Qty: 20 TABLET | Refills: 0 | Status: SHIPPED | OUTPATIENT
Start: 2025-07-14 | End: 2025-07-19

## 2025-07-14 NOTE — Clinical Note
[facility] EMERGENCY DEPARTMENT  [address] [address]  Phone: [phone]    Chanell Dean was seen and treated in our emergency department on 7/14/2025.  She may return to work on 07/17/2025.         Thank you for choosing [facility].    Valente Paris MD

## 2025-07-14 NOTE — DISCHARGE INSTRUCTIONS
You were evaluated for difficulty urinating as well as pain to your labia and new lesions seen on the labia.  We did a physical exam and are concerned for genital herpes.  We have sent a course of acyclovir to help treat this and have sent a confirmatory test to confirm the diagnosis.  Would recommend following up with primary care doctor to ensure that you improve appropriately.  If you do not have a primary care doctor we have provided you the contact formation for Dr. Saez who is a primary care doctor in the area who accepts patients with the emergency department.  Would recommend following up with doctor to ensure that these lesions improve appropriately and to discuss suppressive therapy moving forward.  You are now stable for discharge.

## 2025-07-14 NOTE — ED PROVIDER NOTES
"Subjective:  History of Present Illness:    Patient is a 20-year-old female with history of type 1 diabetes who presents today with dysuria and concerns for skin changes over her labia.  Reports that she has had dysuria over the last 48 hours.  Denies any fevers.  No chest pain or shortness of breath.  Denies any abdominal pain.  No new leg swelling or leg pain.  Reports that she has white bumps per her report over her labia.  She denies any ulcers.  No history of herpes.  Denies any new sexual partners.  No abdominal pain.      Nurses Notes reviewed and agree, including vitals, allergies, social history and prior medical history.     REVIEW OF SYSTEMS: All systems reviewed and not pertinent unless noted.  Review of Systems   Constitutional:  Positive for activity change. Negative for appetite change, chills, fatigue and fever.   HENT:  Negative for rhinorrhea, sinus pressure and sinus pain.    Eyes:  Negative for discharge and itching.   Respiratory:  Negative for cough and shortness of breath.    Cardiovascular:  Negative for chest pain and leg swelling.   Gastrointestinal:  Negative for abdominal distention, abdominal pain, nausea and vomiting.   Endocrine: Negative for cold intolerance and heat intolerance.   Genitourinary:  Positive for difficulty urinating and dysuria. Negative for decreased urine volume, flank pain, frequency, urgency, vaginal bleeding, vaginal discharge and vaginal pain.   Musculoskeletal:  Negative for gait problem, neck pain and neck stiffness.   Skin:  Negative for color change.   Allergic/Immunologic: Negative for environmental allergies.   Neurological:  Negative for seizures, syncope, facial asymmetry and speech difficulty.   Psychiatric/Behavioral:  Negative for self-injury and suicidal ideas.        Past Medical History:   Diagnosis Date    Seizure     \"My blood sugar was high\"    Type 1 diabetes mellitus 2018       Allergies:    Latex      History reviewed. No pertinent surgical " "history.      Social History     Socioeconomic History    Marital status: Single   Tobacco Use    Smoking status: Never     Passive exposure: Never    Smokeless tobacco: Never   Vaping Use    Vaping status: Never Used   Substance and Sexual Activity    Alcohol use: Never    Drug use: Never    Sexual activity: Yes     Birth control/protection: Depo-provera         Family History   Problem Relation Age of Onset    Depression Mother     Diabetes Father     Diabetes Brother     Diabetes Maternal Uncle        Objective  Physical Exam:  /92 (BP Location: Left arm, Patient Position: Sitting)   Pulse 98   Temp 98.1 °F (36.7 °C) (Oral)   Resp 18   Ht 162.6 cm (64\")   Wt 74.8 kg (165 lb)   SpO2 98%   BMI 28.32 kg/m²      Physical Exam  Constitutional:       General: She is not in acute distress.     Appearance: Normal appearance. She is obese. She is not ill-appearing.   HENT:      Head: Normocephalic and atraumatic.      Nose: Nose normal. No congestion or rhinorrhea.      Mouth/Throat:      Mouth: Mucous membranes are dry.      Pharynx: Oropharynx is clear. No oropharyngeal exudate or posterior oropharyngeal erythema.   Eyes:      Extraocular Movements: Extraocular movements intact.      Conjunctiva/sclera: Conjunctivae normal.      Pupils: Pupils are equal, round, and reactive to light.   Cardiovascular:      Rate and Rhythm: Normal rate and regular rhythm.      Pulses: Normal pulses.      Heart sounds: Normal heart sounds.   Pulmonary:      Effort: Pulmonary effort is normal. No respiratory distress.      Breath sounds: Normal breath sounds.   Abdominal:      General: Abdomen is flat. Bowel sounds are normal. There is no distension.      Palpations: Abdomen is soft.      Tenderness: There is no abdominal tenderness. There is no right CVA tenderness, left CVA tenderness, guarding or rebound.   Musculoskeletal:         General: No swelling or tenderness. Normal range of motion.      Cervical back: Normal range " of motion and neck supple.      Right lower leg: No edema.      Left lower leg: No edema.   Skin:     General: Skin is warm and dry.      Capillary Refill: Capillary refill takes less than 2 seconds.   Neurological:      General: No focal deficit present.      Mental Status: She is alert and oriented to person, place, and time. Mental status is at baseline.      Cranial Nerves: No cranial nerve deficit.      Sensory: No sensory deficit.      Motor: No weakness.      Coordination: Coordination normal.   Psychiatric:         Mood and Affect: Mood normal.         Behavior: Behavior normal.         Thought Content: Thought content normal.         Judgment: Judgment normal.         Procedures    ED Course:         Lab Results (last 24 hours)       Procedure Component Value Units Date/Time    Urinalysis With Culture If Indicated - Urine, Clean Catch [[id number]]  (Abnormal) Collected: 07/14/25 0738    Specimen: Urine, Clean Catch Updated: 07/14/25 0748     Color, UA Yellow     Appearance, UA Clear     pH, UA 5.5     Specific Gravity, UA >=1.030     Glucose, UA >=1000 mg/dL (3+)     Ketones, UA >=160 mg/dL (4+)     Bilirubin, UA Negative     Blood, UA Trace     Protein, UA 30 mg/dL (1+)     Leuk Esterase, UA Negative     Nitrite, UA Negative     Urobilinogen, UA 0.2 E.U./dL    Narrative:      In absence of clinical symptoms, the presence of pyuria, bacteria, and/or nitrites on the urinalysis result does not correlate with infection.    Urinalysis, Microscopic Only - Urine, Clean Catch [[id number]]  (Abnormal) Collected: 07/14/25 0738    Specimen: Urine, Clean Catch Updated: 07/14/25 0750     RBC, UA 0-2 /HPF      WBC, UA 3-5 /HPF      Comment: Urine culture not indicated.        Bacteria, UA Trace /HPF      Squamous Epithelial Cells, UA 0-2 /HPF      Hyaline Casts, UA None Seen /LPF      Methodology Manual Light Microscopy    Herpes Simplex Virus Culture - Swab, Vagina [[id number]] Collected: 07/14/25 0755    Specimen: Swab  from Vagina Updated: 07/14/25 0801             No radiology results from the last 24 hrs       MDM      Initial impression of presenting illness: Abdominal pain    DDX: includes but is not limited to: Urinary tract infection, pyelonephritis, herpes, genital warts    Patient arrives stable with vitals interpreted by myself.     Pertinent features from physical exam: Clear to auscultation, regular rate and rhythm, no murmur, nontender to abdominal palpation.  External vaginal exam showing ulcerations with erythema in the periphery that appear consistent with herpetic lesions    Initial diagnostic plan: UA, herpes swab    Results from initial plan were reviewed and interpreted by me revealing UA negative    Diagnostic information from other sources: Reviewed past medical records    Interventions / Re-evaluation: Given acyclovir in emergency department for treatment of active herpes flare    Results/clinical rationale were discussed with patient at bedside    Consultations/Discussion of results with other physicians: Discussed my concern for herpes labialis is etiology of patient's presentation today.  I have prescribed patient's acyclovir and encouraged follow-up with her primary care doctor to ensure improvement of symptoms and to discuss suppressive therapy moving forward.  Did have patient self swab her labia and have sent the swab for further analysis.  Pending at the time of discharge.    Disposition plan: Discharge  -----        Final diagnoses:   Herpes simplex vulvovaginitis          Valente Paris MD  07/15/25 3986

## 2025-07-16 LAB — HSV SPEC CULT: POSITIVE

## 2025-08-25 ENCOUNTER — TRANSCRIBE ORDERS (OUTPATIENT)
Dept: ADMINISTRATIVE | Facility: HOSPITAL | Age: 20
End: 2025-08-25
Payer: COMMERCIAL

## 2025-08-25 DIAGNOSIS — E10.65 TYPE 1 DIABETES MELLITUS WITH HYPERGLYCEMIA: Primary | ICD-10-CM

## 2025-08-25 DIAGNOSIS — Z71.3 DIETARY COUNSELING: ICD-10-CM
